# Patient Record
Sex: FEMALE | Race: WHITE | HISPANIC OR LATINO | Employment: OTHER | URBAN - METROPOLITAN AREA
[De-identification: names, ages, dates, MRNs, and addresses within clinical notes are randomized per-mention and may not be internally consistent; named-entity substitution may affect disease eponyms.]

---

## 2018-08-03 ENCOUNTER — TRANSCRIBE ORDERS (OUTPATIENT)
Dept: RADIOLOGY | Facility: HOSPITAL | Age: 58
End: 2018-08-03

## 2018-08-03 ENCOUNTER — HOSPITAL ENCOUNTER (OUTPATIENT)
Dept: RADIOLOGY | Facility: HOSPITAL | Age: 58
Discharge: HOME | End: 2018-08-03
Payer: COMMERCIAL

## 2018-08-03 DIAGNOSIS — M06.9 RHEUMATOID ARTHRITIS, INVOLVING UNSPECIFIED SITE, UNSPECIFIED RHEUMATOID FACTOR PRESENCE: Primary | ICD-10-CM

## 2018-08-03 DIAGNOSIS — M06.9 RHEUMATOID ARTHRITIS, INVOLVING UNSPECIFIED SITE, UNSPECIFIED RHEUMATOID FACTOR PRESENCE: ICD-10-CM

## 2018-08-03 PROCEDURE — 73562 X-RAY EXAM OF KNEE 3: CPT

## 2018-08-06 ENCOUNTER — TRANSCRIBE ORDERS (OUTPATIENT)
Dept: ADMINISTRATIVE | Facility: HOSPITAL | Age: 58
End: 2018-08-06

## 2018-08-06 DIAGNOSIS — N06.9 ISOLATED PROTEINURIA WITH MORPHOLOGIC LESION: Primary | ICD-10-CM

## 2018-08-14 ENCOUNTER — HOSPITAL ENCOUNTER (OUTPATIENT)
Dept: RADIOLOGY | Facility: HOSPITAL | Age: 58
Discharge: HOME | End: 2018-08-14
Payer: COMMERCIAL

## 2018-08-14 PROCEDURE — 73562 X-RAY EXAM OF KNEE 3: CPT

## 2018-08-29 ENCOUNTER — TRANSCRIBE ORDERS (OUTPATIENT)
Dept: ADMINISTRATIVE | Facility: HOSPITAL | Age: 58
End: 2018-08-29

## 2018-08-29 DIAGNOSIS — R79.89 ELEVATED LFTS: Primary | ICD-10-CM

## 2018-09-25 ENCOUNTER — TRANSCRIBE ORDERS (OUTPATIENT)
Dept: RADIOLOGY | Facility: HOSPITAL | Age: 58
End: 2018-09-25

## 2018-09-25 ENCOUNTER — HOSPITAL ENCOUNTER (OUTPATIENT)
Dept: RADIOLOGY | Facility: HOSPITAL | Age: 58
Discharge: HOME | End: 2018-09-25
Payer: COMMERCIAL

## 2018-09-25 ENCOUNTER — HOSPITAL ENCOUNTER (OUTPATIENT)
Dept: NON INVASIVE DIAGNOSTICS | Facility: HOSPITAL | Age: 58
Discharge: HOME | End: 2018-09-25
Payer: COMMERCIAL

## 2018-09-25 DIAGNOSIS — R79.89 ELEVATED LFTS: ICD-10-CM

## 2018-09-25 DIAGNOSIS — M25.50 POLYARTHRALGIA: ICD-10-CM

## 2018-09-25 DIAGNOSIS — N06.9 ISOLATED PROTEINURIA WITH MORPHOLOGIC LESION: ICD-10-CM

## 2018-09-25 DIAGNOSIS — M25.50 PAIN IN JOINT, MULTIPLE SITES: ICD-10-CM

## 2018-09-25 DIAGNOSIS — M25.50 PAIN IN JOINT, MULTIPLE SITES: Primary | ICD-10-CM

## 2018-09-25 PROCEDURE — 93306 TTE W/DOPPLER COMPLETE: CPT

## 2018-09-25 PROCEDURE — 76705 ECHO EXAM OF ABDOMEN: CPT

## 2018-09-25 PROCEDURE — 73521 X-RAY EXAM HIPS BI 2 VIEWS: CPT

## 2018-09-25 PROCEDURE — 73130 X-RAY EXAM OF HAND: CPT

## 2018-09-25 PROCEDURE — 93306 TTE W/DOPPLER COMPLETE: CPT | Performed by: INTERNAL MEDICINE

## 2018-11-05 ENCOUNTER — TRANSCRIBE ORDERS (OUTPATIENT)
Dept: ADMINISTRATIVE | Facility: HOSPITAL | Age: 58
End: 2018-11-05

## 2018-11-05 DIAGNOSIS — Z12.39 SCREENING BREAST EXAMINATION: Primary | ICD-10-CM

## 2018-11-14 ENCOUNTER — HOSPITAL ENCOUNTER (OUTPATIENT)
Dept: RADIOLOGY | Facility: HOSPITAL | Age: 58
Discharge: HOME | End: 2018-11-14
Payer: COMMERCIAL

## 2018-11-14 VITALS — WEIGHT: 173 LBS | BODY MASS INDEX: 30.65 KG/M2 | HEIGHT: 63 IN

## 2018-11-14 DIAGNOSIS — Z12.39 SCREENING BREAST EXAMINATION: ICD-10-CM

## 2018-11-14 PROCEDURE — 77067 SCR MAMMO BI INCL CAD: CPT

## 2018-11-27 ENCOUNTER — HOSPITAL ENCOUNTER (OUTPATIENT)
Dept: MAMMOGRAPHY | Facility: CLINIC | Age: 58
Discharge: HOME | End: 2018-11-27
Payer: COMMERCIAL

## 2018-11-27 ENCOUNTER — TRANSCRIBE ORDERS (OUTPATIENT)
Dept: MAMMOGRAPHY | Facility: CLINIC | Age: 58
End: 2018-11-27

## 2018-11-27 ENCOUNTER — HOSPITAL ENCOUNTER (OUTPATIENT)
Dept: ULTRASOUND IMAGING | Facility: CLINIC | Age: 58
Discharge: HOME | End: 2018-11-27
Payer: COMMERCIAL

## 2018-11-27 DIAGNOSIS — R92.8 ABNORMAL MAMMOGRAM: ICD-10-CM

## 2018-11-27 DIAGNOSIS — R92.8 ABNORMAL MAMMOGRAM: Primary | ICD-10-CM

## 2018-11-27 PROCEDURE — 77065 DX MAMMO INCL CAD UNI: CPT

## 2018-11-27 PROCEDURE — G0279 TOMOSYNTHESIS, MAMMO: HCPCS

## 2018-11-27 PROCEDURE — 76642 ULTRASOUND BREAST LIMITED: CPT

## 2019-04-10 ENCOUNTER — HOSPITAL ENCOUNTER (EMERGENCY)
Facility: HOSPITAL | Age: 59
Discharge: HOME | End: 2019-04-10
Attending: EMERGENCY MEDICINE
Payer: COMMERCIAL

## 2019-04-10 VITALS
HEART RATE: 85 BPM | BODY MASS INDEX: 31.48 KG/M2 | TEMPERATURE: 97.8 F | WEIGHT: 177.69 LBS | SYSTOLIC BLOOD PRESSURE: 131 MMHG | OXYGEN SATURATION: 98 % | HEIGHT: 63 IN | DIASTOLIC BLOOD PRESSURE: 68 MMHG | RESPIRATION RATE: 18 BRPM

## 2019-04-10 DIAGNOSIS — J40 BRONCHITIS: Primary | ICD-10-CM

## 2019-04-10 PROCEDURE — 99283 EMERGENCY DEPT VISIT LOW MDM: CPT

## 2019-04-10 PROCEDURE — 99284 EMERGENCY DEPT VISIT MOD MDM: CPT | Performed by: EMERGENCY MEDICINE

## 2019-04-10 PROCEDURE — 94640 AIRWAY INHALATION TREATMENT: CPT

## 2019-04-10 RX ORDER — ALENDRONATE SODIUM 70 MG/1
70 TABLET ORAL
COMMUNITY

## 2019-04-10 RX ORDER — ALBUTEROL SULFATE 2.5 MG/3ML
2.5 SOLUTION RESPIRATORY (INHALATION) ONCE
Status: COMPLETED | OUTPATIENT
Start: 2019-04-10 | End: 2019-04-10

## 2019-04-10 RX ORDER — NAPROXEN 500 MG/1
500 TABLET ORAL 2 TIMES DAILY WITH MEALS
COMMUNITY

## 2019-04-10 RX ORDER — AZITHROMYCIN 250 MG/1
TABLET, FILM COATED ORAL
Qty: 6 TABLET | Refills: 0 | Status: SHIPPED | OUTPATIENT
Start: 2019-04-10 | End: 2019-04-14

## 2019-04-10 RX ORDER — LEVOTHYROXINE SODIUM 0.03 MG/1
25 TABLET ORAL DAILY
COMMUNITY
End: 2020-11-23 | Stop reason: SDUPTHER

## 2019-04-10 RX ORDER — IBUPROFEN 200 MG
200 TABLET ORAL EVERY 6 HOURS PRN
COMMUNITY
End: 2021-02-25

## 2019-04-10 RX ORDER — DICLOFENAC SODIUM 75 MG/1
75 TABLET, DELAYED RELEASE ORAL 2 TIMES DAILY
COMMUNITY
End: 2020-11-23 | Stop reason: SDUPTHER

## 2019-04-10 RX ORDER — GABAPENTIN 100 MG/1
100 CAPSULE ORAL 3 TIMES DAILY
COMMUNITY

## 2019-04-10 RX ORDER — LANSOPRAZOLE 30 MG/1
30 CAPSULE, DELAYED RELEASE ORAL DAILY
COMMUNITY

## 2019-04-10 RX ORDER — CODEINE PHOSPHATE AND GUAIFENESIN 10; 100 MG/5ML; MG/5ML
5 SOLUTION ORAL 3 TIMES DAILY PRN
Qty: 50 ML | Refills: 0 | Status: SHIPPED | OUTPATIENT
Start: 2019-04-10 | End: 2019-04-20

## 2019-04-10 RX ORDER — ALBUTEROL SULFATE 90 UG/1
2 AEROSOL, METERED RESPIRATORY (INHALATION) EVERY 4 HOURS PRN
Qty: 1 INHALER | Refills: 0 | Status: SHIPPED | OUTPATIENT
Start: 2019-04-10 | End: 2021-02-25

## 2019-04-10 RX ORDER — DULOXETIN HYDROCHLORIDE 30 MG/1
30 CAPSULE, DELAYED RELEASE ORAL 2 TIMES DAILY
COMMUNITY
End: 2021-02-25

## 2019-04-10 RX ORDER — SIMVASTATIN 40 MG
40 TABLET ORAL
COMMUNITY
End: 2020-11-23 | Stop reason: SDUPTHER

## 2019-04-10 RX ADMIN — ALBUTEROL SULFATE 2.5 MG: 2.5 SOLUTION RESPIRATORY (INHALATION) at 11:23

## 2019-05-03 ENCOUNTER — APPOINTMENT (OUTPATIENT)
Dept: LAB | Facility: HOSPITAL | Age: 59
End: 2019-05-03
Payer: COMMERCIAL

## 2019-05-03 DIAGNOSIS — Z01.812 PRE-OPERATIVE LABORATORY EXAMINATION: ICD-10-CM

## 2019-05-03 DIAGNOSIS — K64.9 HEMORRHOIDS WITHOUT COMPLICATION: Primary | ICD-10-CM

## 2019-05-03 LAB
BASOPHILS # BLD AUTO: 0.03 THOUSANDS/ΜL (ref 0–0.1)
BASOPHILS NFR BLD AUTO: 1 % (ref 0–1)
EOSINOPHIL # BLD AUTO: 0.49 THOUSAND/ΜL (ref 0–0.61)
EOSINOPHIL NFR BLD AUTO: 9 % (ref 0–6)
ERYTHROCYTE [DISTWIDTH] IN BLOOD BY AUTOMATED COUNT: 12.6 % (ref 11.6–15.1)
HCT VFR BLD AUTO: 40.2 % (ref 34.8–46.1)
HGB BLD-MCNC: 12.9 G/DL (ref 11.5–15.4)
IMM GRANULOCYTES # BLD AUTO: 0.01 THOUSAND/UL (ref 0–0.2)
IMM GRANULOCYTES NFR BLD AUTO: 0 % (ref 0–2)
LYMPHOCYTES # BLD AUTO: 1.49 THOUSANDS/ΜL (ref 0.6–4.47)
LYMPHOCYTES NFR BLD AUTO: 27 % (ref 14–44)
MCH RBC QN AUTO: 30.9 PG (ref 26.8–34.3)
MCHC RBC AUTO-ENTMCNC: 32.1 G/DL (ref 31.4–37.4)
MCV RBC AUTO: 96 FL (ref 82–98)
MONOCYTES # BLD AUTO: 0.5 THOUSAND/ΜL (ref 0.17–1.22)
MONOCYTES NFR BLD AUTO: 9 % (ref 4–12)
NEUTROPHILS # BLD AUTO: 3.06 THOUSANDS/ΜL (ref 1.85–7.62)
NEUTS SEG NFR BLD AUTO: 54 % (ref 43–75)
NRBC BLD AUTO-RTO: 0 /100 WBCS
PLATELET # BLD AUTO: 278 THOUSANDS/UL (ref 149–390)
PMV BLD AUTO: 10.9 FL (ref 8.9–12.7)
RBC # BLD AUTO: 4.18 MILLION/UL (ref 3.81–5.12)
WBC # BLD AUTO: 5.58 THOUSAND/UL (ref 4.31–10.16)

## 2019-05-03 PROCEDURE — 85025 COMPLETE CBC W/AUTO DIFF WBC: CPT

## 2019-05-03 PROCEDURE — 36415 COLL VENOUS BLD VENIPUNCTURE: CPT

## 2019-05-29 ENCOUNTER — TRANSCRIBE ORDERS (OUTPATIENT)
Dept: ADMINISTRATIVE | Facility: HOSPITAL | Age: 59
End: 2019-05-29

## 2019-05-29 DIAGNOSIS — R92.8 ABNORMAL MAMMOGRAM: Primary | ICD-10-CM

## 2019-05-30 ENCOUNTER — HOSPITAL ENCOUNTER (OUTPATIENT)
Dept: MAMMOGRAPHY | Facility: CLINIC | Age: 59
Discharge: HOME | End: 2019-05-30
Payer: COMMERCIAL

## 2019-05-30 ENCOUNTER — HOSPITAL ENCOUNTER (OUTPATIENT)
Dept: ULTRASOUND IMAGING | Facility: CLINIC | Age: 59
Discharge: HOME | End: 2019-05-30
Payer: COMMERCIAL

## 2019-05-30 ENCOUNTER — TRANSCRIBE ORDERS (OUTPATIENT)
Dept: MAMMOGRAPHY | Facility: CLINIC | Age: 59
End: 2019-05-30

## 2019-05-30 VITALS — BODY MASS INDEX: 29.99 KG/M2 | WEIGHT: 180 LBS | HEIGHT: 65 IN

## 2019-05-30 DIAGNOSIS — R92.8 ABNORMAL MAMMOGRAM: ICD-10-CM

## 2019-05-30 DIAGNOSIS — R92.8 ABNORMAL MAMMOGRAM: Primary | ICD-10-CM

## 2019-05-30 PROCEDURE — 76642 ULTRASOUND BREAST LIMITED: CPT

## 2019-05-30 PROCEDURE — G0279 TOMOSYNTHESIS, MAMMO: HCPCS

## 2019-05-30 PROCEDURE — 77065 DX MAMMO INCL CAD UNI: CPT

## 2019-07-16 ENCOUNTER — TRANSCRIBE ORDERS (OUTPATIENT)
Dept: LAB | Facility: HOSPITAL | Age: 59
End: 2019-07-16

## 2019-07-16 ENCOUNTER — APPOINTMENT (OUTPATIENT)
Dept: LAB | Facility: HOSPITAL | Age: 59
End: 2019-07-16
Attending: INTERNAL MEDICINE
Payer: COMMERCIAL

## 2019-07-16 DIAGNOSIS — E66.01 CLASS 2 SEVERE OBESITY DUE TO EXCESS CALORIES WITH SERIOUS COMORBIDITY IN ADULT, UNSPECIFIED BMI (HCC): ICD-10-CM

## 2019-07-16 DIAGNOSIS — E03.9 MYXEDEMA HEART DISEASE: ICD-10-CM

## 2019-07-16 DIAGNOSIS — I51.9 MYXEDEMA HEART DISEASE: ICD-10-CM

## 2019-07-16 DIAGNOSIS — M81.0 SENILE OSTEOPOROSIS: ICD-10-CM

## 2019-07-16 DIAGNOSIS — I51.9 MYXEDEMA HEART DISEASE: Primary | ICD-10-CM

## 2019-07-16 DIAGNOSIS — E03.9 MYXEDEMA HEART DISEASE: Primary | ICD-10-CM

## 2019-07-16 LAB
25(OH)D3 SERPL-MCNC: 25.2 NG/ML (ref 30–100)
ALBUMIN SERPL BCP-MCNC: 3.7 G/DL (ref 3.5–5)
ALP SERPL-CCNC: 70 U/L (ref 46–116)
ALT SERPL W P-5'-P-CCNC: 63 U/L (ref 12–78)
ANION GAP SERPL CALCULATED.3IONS-SCNC: 3 MMOL/L (ref 4–13)
AST SERPL W P-5'-P-CCNC: 37 U/L (ref 5–45)
BILIRUB SERPL-MCNC: 0.44 MG/DL (ref 0.2–1)
BUN SERPL-MCNC: 12 MG/DL (ref 5–25)
CALCIUM SERPL-MCNC: 8.4 MG/DL (ref 8.3–10.1)
CHLORIDE SERPL-SCNC: 106 MMOL/L (ref 100–108)
CHOLEST SERPL-MCNC: 202 MG/DL (ref 50–200)
CO2 SERPL-SCNC: 27 MMOL/L (ref 21–32)
CREAT SERPL-MCNC: 0.87 MG/DL (ref 0.6–1.3)
EST. AVERAGE GLUCOSE BLD GHB EST-MCNC: 126 MG/DL
GFR SERPL CREATININE-BSD FRML MDRD: 72 ML/MIN/1.73SQ M
GLUCOSE P FAST SERPL-MCNC: 116 MG/DL (ref 65–99)
HBA1C MFR BLD: 6 % (ref 4.2–6.3)
HDLC SERPL-MCNC: 38 MG/DL (ref 40–60)
LDLC SERPL CALC-MCNC: 118 MG/DL (ref 0–100)
NONHDLC SERPL-MCNC: 164 MG/DL
POTASSIUM SERPL-SCNC: 3.8 MMOL/L (ref 3.5–5.3)
PROT SERPL-MCNC: 8 G/DL (ref 6.4–8.2)
SODIUM SERPL-SCNC: 136 MMOL/L (ref 136–145)
TRIGL SERPL-MCNC: 228 MG/DL
TSH SERPL DL<=0.05 MIU/L-ACNC: 1.42 UIU/ML (ref 0.36–3.74)

## 2019-07-16 PROCEDURE — 36415 COLL VENOUS BLD VENIPUNCTURE: CPT

## 2019-07-16 PROCEDURE — 83036 HEMOGLOBIN GLYCOSYLATED A1C: CPT

## 2019-07-16 PROCEDURE — 84443 ASSAY THYROID STIM HORMONE: CPT

## 2019-07-16 PROCEDURE — 80053 COMPREHEN METABOLIC PANEL: CPT

## 2019-07-16 PROCEDURE — 82306 VITAMIN D 25 HYDROXY: CPT

## 2019-07-16 PROCEDURE — 80061 LIPID PANEL: CPT

## 2019-07-24 ENCOUNTER — TRANSCRIBE ORDERS (OUTPATIENT)
Dept: ADMINISTRATIVE | Facility: HOSPITAL | Age: 59
End: 2019-07-24

## 2019-07-24 DIAGNOSIS — M54.50 LOW BACK PAIN WITHOUT SCIATICA, UNSPECIFIED BACK PAIN LATERALITY, UNSPECIFIED CHRONICITY: Primary | ICD-10-CM

## 2019-08-04 ENCOUNTER — HOSPITAL ENCOUNTER (OUTPATIENT)
Dept: RADIOLOGY | Facility: HOSPITAL | Age: 59
Discharge: HOME | End: 2019-08-04
Payer: COMMERCIAL

## 2019-08-04 DIAGNOSIS — M54.50 LOW BACK PAIN WITHOUT SCIATICA, UNSPECIFIED BACK PAIN LATERALITY, UNSPECIFIED CHRONICITY: ICD-10-CM

## 2019-08-04 PROCEDURE — 72148 MRI LUMBAR SPINE W/O DYE: CPT

## 2019-09-14 ENCOUNTER — HOSPITAL ENCOUNTER (EMERGENCY)
Facility: HOSPITAL | Age: 59
Discharge: HOME | End: 2019-09-14
Attending: EMERGENCY MEDICINE | Admitting: EMERGENCY MEDICINE
Payer: COMMERCIAL

## 2019-09-14 ENCOUNTER — APPOINTMENT (EMERGENCY)
Dept: RADIOLOGY | Facility: HOSPITAL | Age: 59
End: 2019-09-14
Payer: COMMERCIAL

## 2019-09-14 VITALS
BODY MASS INDEX: 31.62 KG/M2 | HEART RATE: 71 BPM | OXYGEN SATURATION: 97 % | TEMPERATURE: 97.9 F | RESPIRATION RATE: 16 BRPM | WEIGHT: 190 LBS | SYSTOLIC BLOOD PRESSURE: 155 MMHG | DIASTOLIC BLOOD PRESSURE: 69 MMHG

## 2019-09-14 DIAGNOSIS — S83.92XA LEFT KNEE SPRAIN: Primary | ICD-10-CM

## 2019-09-14 PROCEDURE — 73564 X-RAY EXAM KNEE 4 OR MORE: CPT

## 2019-09-14 PROCEDURE — 96372 THER/PROPH/DIAG INJ SC/IM: CPT

## 2019-09-14 PROCEDURE — 99284 EMERGENCY DEPT VISIT MOD MDM: CPT | Performed by: PHYSICIAN ASSISTANT

## 2019-09-14 PROCEDURE — 99283 EMERGENCY DEPT VISIT LOW MDM: CPT

## 2019-09-14 RX ORDER — LIDOCAINE 50 MG/G
1 PATCH TOPICAL ONCE
Status: DISCONTINUED | OUTPATIENT
Start: 2019-09-14 | End: 2019-09-14 | Stop reason: HOSPADM

## 2019-09-14 RX ORDER — METHOCARBAMOL 500 MG/1
500 TABLET, FILM COATED ORAL 3 TIMES DAILY
Qty: 15 TABLET | Refills: 0 | Status: SHIPPED | OUTPATIENT
Start: 2019-09-14 | End: 2020-11-23 | Stop reason: ALTCHOICE

## 2019-09-14 RX ORDER — KETOROLAC TROMETHAMINE 30 MG/ML
15 INJECTION, SOLUTION INTRAMUSCULAR; INTRAVENOUS ONCE
Status: COMPLETED | OUTPATIENT
Start: 2019-09-14 | End: 2019-09-14

## 2019-09-14 RX ORDER — METHOCARBAMOL 500 MG/1
500 TABLET, FILM COATED ORAL ONCE
Status: COMPLETED | OUTPATIENT
Start: 2019-09-14 | End: 2019-09-14

## 2019-09-14 RX ADMIN — KETOROLAC TROMETHAMINE 15 MG: 30 INJECTION, SOLUTION INTRAMUSCULAR at 13:29

## 2019-09-14 RX ADMIN — LIDOCAINE 1 PATCH: 50 PATCH CUTANEOUS at 13:29

## 2019-09-14 RX ADMIN — METHOCARBAMOL TABLETS 500 MG: 500 TABLET, COATED ORAL at 13:30

## 2019-09-14 NOTE — DISCHARGE INSTRUCTIONS
-Continue taking diclofenac sodium 75 mg as prescribed as needed for pain  - Apply 4% lidocaine cream to area  - Apply ACE wrap as needed  - Bear weight as tolerated

## 2019-09-15 NOTE — ED PROVIDER NOTES
History  Chief Complaint   Patient presents with    Knee Pain     pt c/o left knee pain that started without injury on thursday at the doctors office  reports increased pain when she stepped on to the bus     77-year-old female with no significant past medical history presents for evaluation of left knee pain for the past 3 days  Patient reports that she has been having pain over the posterior aspect of her knee which worsened today when she was walking and stepped on the bus  Patient reports that she felt a popping sensation and has pain over the posterior aspect of her knee  She denies radiation pain down her leg her upper leg  States that she has taken Voltaren for this with some relief  Last dose was this morning  Patient states that she denies any trauma or injury  Reports she is not able to bear much weight on the knee due to pain  She denies paresthesias, open wounds or swelling  Denies fever, chills  No history of DVTs in the past           Prior to Admission Medications   Prescriptions Last Dose Informant Patient Reported? Taking?    DULoxetine (CYMBALTA) 30 mg delayed release capsule   Yes No   Sig: Take 30 mg by mouth 2 (two) times a day   albuterol (PROVENTIL HFA,VENTOLIN HFA) 90 mcg/act inhaler   No No   Sig: Inhale 2 puffs every 4 (four) hours as needed for wheezing   alendronate (FOSAMAX) 70 mg tablet   Yes No   Sig: Take 70 mg by mouth every 7 days   diclofenac (VOLTAREN) 75 mg EC tablet   Yes No   Sig: Take 75 mg by mouth 2 (two) times a day   gabapentin (NEURONTIN) 100 mg capsule   Yes No   Sig: Take 100 mg by mouth 3 (three) times a day   ibuprofen (MOTRIN) 200 mg tablet   Yes No   Sig: Take 200 mg by mouth every 6 (six) hours as needed for mild pain   lansoprazole (PREVACID) 30 mg capsule   Yes No   Sig: Take 30 mg by mouth daily   levothyroxine 25 mcg tablet   Yes No   Sig: Take 25 mcg by mouth daily   naproxen (NAPROSYN) 500 mg tablet   Yes No   Sig: Take 500 mg by mouth 2 (two) times a day with meals   simvastatin (ZOCOR) 40 mg tablet   Yes No   Sig: Take 40 mg by mouth daily at bedtime      Facility-Administered Medications: None       History reviewed  No pertinent past medical history  Past Surgical History:   Procedure Laterality Date    BREAST EXCISIONAL BIOPSY Left 2015    in [place] [facility]    BREAST EXCISIONAL BIOPSY Left 2016    in [address]      @ age 38       Family History   Problem Relation Age of Onset    No Known Problems Sister     No Known Problems Daughter     Breast cancer Half-Sister     No Known Problems Sister      I have reviewed and agree with the history as documented  Social History     Tobacco Use    Smoking status: Never Smoker    Smokeless tobacco: Never Used   Substance Use Topics    Alcohol use: Not Currently    Drug use: Never        Review of Systems   Constitutional: Negative for chills and fever  Musculoskeletal: Positive for arthralgias, gait problem and myalgias  Negative for joint swelling  Skin: Negative for color change, pallor, rash and wound  Neurological: Negative for weakness and numbness  Physical Exam  Physical Exam   Constitutional: She appears well-developed and well-nourished  No distress  Musculoskeletal: She exhibits tenderness  She exhibits no edema or deformity  Left knee: She exhibits decreased range of motion  She exhibits no swelling, no effusion, no ecchymosis, no deformity, no laceration, no erythema, normal alignment, no LCL laxity, normal patellar mobility, no bony tenderness, normal meniscus and no MCL laxity  Tenderness found  No medial joint line, no lateral joint line, no MCL, no LCL and no patellar tendon tenderness noted  ttp over the popliteal region  Able to flex greater than 90 degrees however starts to have pain  Full extension of knee  5/5 strength  Other joints of left lower extremity full AROM and 5/5 strength  DP pulses 2+ and CR < 2 seconds  Neurological: She is alert  Skin: Skin is warm  Capillary refill takes less than 2 seconds  No rash noted  She is not diaphoretic  No erythema  No pallor  Psychiatric: She has a normal mood and affect  Vital Signs  ED Triage Vitals [09/14/19 1206]   Temperature Pulse Respirations Blood Pressure SpO2   97 9 °F (36 6 °C) 71 16 155/69 97 %      Temp Source Heart Rate Source Patient Position - Orthostatic VS BP Location FiO2 (%)   Tympanic -- -- -- --      Pain Score       7           Vitals:    09/14/19 1206   BP: 155/69   Pulse: 71         Visual Acuity      ED Medications  Medications   ketorolac (TORADOL) injection 15 mg (15 mg Intramuscular Given 9/14/19 1329)   methocarbamol (ROBAXIN) tablet 500 mg (500 mg Oral Given 9/14/19 1330)       Diagnostic Studies  Results Reviewed     None                 XR knee 4+ vw left injury   ED Interpretation by Iliana Ty PA-C (09/14 1238)   No fracture  Degenerative changes  Final Result by Sylwia Crockett MD (09/15 0700)      No acute osseous abnormality  Workstation performed: [id number]                    Procedures  Procedures       ED Course                               MDM  Number of Diagnoses or Management Options  Left knee sprain:       Disposition  Final diagnoses:   Left knee sprain     Time reflects when diagnosis was documented in both MDM as applicable and the Disposition within this note     Time User Action Codes Description Comment    9/14/2019 12:56 PM Ashely James Add Lis Shackelford  92XA] Left knee sprain       ED Disposition     ED Disposition Condition Date/Time Comment    Discharge Stable Sat Sep 14, 2019 12:56 PM [facility] discharge to home/self care              Follow-up Information     Follow up With Specialties Details Why Contact Info Additional [address] [facility] Schedule an appointment as soon as possible for a visit in 3 days Follow up for further evaluation, If symptoms worsen [address] [facility] [id number], [address]          Discharge Medication List as of 9/14/2019 12:57 PM      START taking these medications    Details   methocarbamol (ROBAXIN) 500 mg tablet Take 1 tablet (500 mg total) by mouth 3 (three) times a day for 5 days, Starting Sat 9/14/2019, Until Thu 9/19/2019, Print         CONTINUE these medications which have NOT CHANGED    Details   albuterol (PROVENTIL HFA,VENTOLIN HFA) 90 mcg/act inhaler Inhale 2 puffs every 4 (four) hours as needed for wheezing, Starting Wed 4/10/2019, Print      alendronate (FOSAMAX) 70 mg tablet Take 70 mg by mouth every 7 days, Historical Med      diclofenac (VOLTAREN) 75 mg EC tablet Take 75 mg by mouth 2 (two) times a day, Historical Med      DULoxetine (CYMBALTA) 30 mg delayed release capsule Take 30 mg by mouth 2 (two) times a day, Historical Med      gabapentin (NEURONTIN) 100 mg capsule Take 100 mg by mouth 3 (three) times a day, Historical Med      ibuprofen (MOTRIN) 200 mg tablet Take 200 mg by mouth every 6 (six) hours as needed for mild pain, Historical Med      lansoprazole (PREVACID) 30 mg capsule Take 30 mg by mouth daily, Historical Med      levothyroxine 25 mcg tablet Take 25 mcg by mouth daily, Historical Med      naproxen (NAPROSYN) 500 mg tablet Take 500 mg by mouth 2 (two) times a day with meals, Historical Med      simvastatin (ZOCOR) 40 mg tablet Take 40 mg by mouth daily at bedtime, Historical Med           No discharge procedures on file      ED Provider  Electronically Signed by           Leandra Mann PA-C  09/15/19 1016

## 2019-10-28 ENCOUNTER — TRANSCRIBE ORDERS (OUTPATIENT)
Dept: ADMINISTRATIVE | Facility: HOSPITAL | Age: 59
End: 2019-10-28

## 2019-10-28 DIAGNOSIS — E78.5 HYPERLIPIDEMIA, UNSPECIFIED HYPERLIPIDEMIA TYPE: Primary | ICD-10-CM

## 2019-10-28 DIAGNOSIS — E66.9 OBESITY, UNSPECIFIED CLASSIFICATION, UNSPECIFIED OBESITY TYPE, UNSPECIFIED WHETHER SERIOUS COMORBIDITY PRESENT: ICD-10-CM

## 2019-10-30 ENCOUNTER — HOSPITAL ENCOUNTER (OUTPATIENT)
Dept: RADIOLOGY | Facility: HOSPITAL | Age: 59
Discharge: HOME | End: 2019-10-30
Attending: INTERNAL MEDICINE
Payer: COMMERCIAL

## 2019-10-30 DIAGNOSIS — E66.9 OBESITY, UNSPECIFIED CLASSIFICATION, UNSPECIFIED OBESITY TYPE, UNSPECIFIED WHETHER SERIOUS COMORBIDITY PRESENT: ICD-10-CM

## 2019-10-30 DIAGNOSIS — E78.5 HYPERLIPIDEMIA, UNSPECIFIED HYPERLIPIDEMIA TYPE: ICD-10-CM

## 2019-10-30 PROCEDURE — 76700 US EXAM ABDOM COMPLETE: CPT

## 2020-06-11 ENCOUNTER — APPOINTMENT (OUTPATIENT)
Dept: LAB | Facility: HOSPITAL | Age: 60
End: 2020-06-11
Payer: MEDICARE

## 2020-06-11 DIAGNOSIS — E66.9 OBESITY, MILD: Primary | ICD-10-CM

## 2020-06-11 LAB
ALBUMIN SERPL BCP-MCNC: 3.8 G/DL (ref 3.5–5)
ALP SERPL-CCNC: 68 U/L (ref 46–116)
ALT SERPL W P-5'-P-CCNC: 68 U/L (ref 12–78)
ANION GAP SERPL CALCULATED.3IONS-SCNC: 1 MMOL/L (ref 4–13)
AST SERPL W P-5'-P-CCNC: 40 U/L (ref 5–45)
BILIRUB SERPL-MCNC: 0.46 MG/DL (ref 0.2–1)
BUN SERPL-MCNC: 12 MG/DL (ref 5–25)
CALCIUM SERPL-MCNC: 9.2 MG/DL (ref 8.3–10.1)
CHLORIDE SERPL-SCNC: 106 MMOL/L (ref 100–108)
CHOLEST SERPL-MCNC: 134 MG/DL (ref 50–200)
CO2 SERPL-SCNC: 31 MMOL/L (ref 21–32)
CREAT SERPL-MCNC: 0.88 MG/DL (ref 0.6–1.3)
EST. AVERAGE GLUCOSE BLD GHB EST-MCNC: 123 MG/DL
GFR SERPL CREATININE-BSD FRML MDRD: 71 ML/MIN/1.73SQ M
GLUCOSE P FAST SERPL-MCNC: 108 MG/DL (ref 65–99)
HBA1C MFR BLD: 5.9 %
HDLC SERPL-MCNC: 45 MG/DL
LDLC SERPL CALC-MCNC: 51 MG/DL (ref 0–100)
NONHDLC SERPL-MCNC: 89 MG/DL
POTASSIUM SERPL-SCNC: 3.9 MMOL/L (ref 3.5–5.3)
PROT SERPL-MCNC: 8.1 G/DL (ref 6.4–8.2)
SODIUM SERPL-SCNC: 138 MMOL/L (ref 136–145)
T4 FREE SERPL-MCNC: 1.06 NG/DL (ref 0.76–1.46)
TRIGL SERPL-MCNC: 188 MG/DL
TSH SERPL DL<=0.05 MIU/L-ACNC: 4.4 UIU/ML (ref 0.36–3.74)

## 2020-06-11 PROCEDURE — 80053 COMPREHEN METABOLIC PANEL: CPT

## 2020-06-11 PROCEDURE — 84443 ASSAY THYROID STIM HORMONE: CPT

## 2020-06-11 PROCEDURE — 84439 ASSAY OF FREE THYROXINE: CPT

## 2020-06-11 PROCEDURE — 36415 COLL VENOUS BLD VENIPUNCTURE: CPT

## 2020-06-11 PROCEDURE — 80061 LIPID PANEL: CPT

## 2020-06-11 PROCEDURE — 83036 HEMOGLOBIN GLYCOSYLATED A1C: CPT

## 2020-06-22 ENCOUNTER — HOSPITAL ENCOUNTER (EMERGENCY)
Facility: HOSPITAL | Age: 60
Discharge: HOME | End: 2020-06-22
Attending: EMERGENCY MEDICINE | Admitting: EMERGENCY MEDICINE
Payer: MEDICARE

## 2020-06-22 ENCOUNTER — APPOINTMENT (EMERGENCY)
Dept: RADIOLOGY | Facility: HOSPITAL | Age: 60
End: 2020-06-22
Payer: MEDICARE

## 2020-06-22 VITALS
HEIGHT: 65 IN | OXYGEN SATURATION: 95 % | SYSTOLIC BLOOD PRESSURE: 128 MMHG | TEMPERATURE: 97.9 F | DIASTOLIC BLOOD PRESSURE: 67 MMHG | WEIGHT: 175 LBS | BODY MASS INDEX: 29.16 KG/M2 | RESPIRATION RATE: 20 BRPM | HEART RATE: 80 BPM

## 2020-06-22 DIAGNOSIS — R19.7 DIARRHEA, UNSPECIFIED TYPE: ICD-10-CM

## 2020-06-22 DIAGNOSIS — R10.30 LOWER ABDOMINAL PAIN: Primary | ICD-10-CM

## 2020-06-22 LAB
ALBUMIN SERPL BCP-MCNC: 4 G/DL (ref 3.5–5)
ALP SERPL-CCNC: 71 U/L (ref 46–116)
ALT SERPL W P-5'-P-CCNC: 68 U/L (ref 12–78)
ANION GAP SERPL CALCULATED.3IONS-SCNC: 3 MMOL/L (ref 4–13)
AST SERPL W P-5'-P-CCNC: 54 U/L (ref 5–45)
BASOPHILS # BLD AUTO: 0.02 THOUSANDS/ΜL (ref 0–0.1)
BASOPHILS NFR BLD AUTO: 0 % (ref 0–1)
BILIRUB SERPL-MCNC: 0.44 MG/DL (ref 0.2–1)
BILIRUB UR QL STRIP: NEGATIVE
BUN SERPL-MCNC: 11 MG/DL (ref 5–25)
CALCIUM SERPL-MCNC: 10 MG/DL (ref 8.3–10.1)
CHLORIDE SERPL-SCNC: 106 MMOL/L (ref 100–108)
CLARITY UR: CLEAR
CO2 SERPL-SCNC: 29 MMOL/L (ref 21–32)
COLOR UR: YELLOW
CREAT SERPL-MCNC: 1.01 MG/DL (ref 0.6–1.3)
EOSINOPHIL # BLD AUTO: 0.14 THOUSAND/ΜL (ref 0–0.61)
EOSINOPHIL NFR BLD AUTO: 2 % (ref 0–6)
ERYTHROCYTE [DISTWIDTH] IN BLOOD BY AUTOMATED COUNT: 12.7 % (ref 11.6–15.1)
GFR SERPL CREATININE-BSD FRML MDRD: 60 ML/MIN/1.73SQ M
GLUCOSE SERPL-MCNC: 115 MG/DL (ref 65–140)
GLUCOSE UR STRIP-MCNC: NEGATIVE MG/DL
HCT VFR BLD AUTO: 43.7 % (ref 34.8–46.1)
HGB BLD-MCNC: 14 G/DL (ref 11.5–15.4)
HGB UR QL STRIP.AUTO: NEGATIVE
IMM GRANULOCYTES # BLD AUTO: 0.01 THOUSAND/UL (ref 0–0.2)
IMM GRANULOCYTES NFR BLD AUTO: 0 % (ref 0–2)
KETONES UR STRIP-MCNC: NEGATIVE MG/DL
LEUKOCYTE ESTERASE UR QL STRIP: NEGATIVE
LIPASE SERPL-CCNC: 226 U/L (ref 73–393)
LYMPHOCYTES # BLD AUTO: 1.96 THOUSANDS/ΜL (ref 0.6–4.47)
LYMPHOCYTES NFR BLD AUTO: 31 % (ref 14–44)
MCH RBC QN AUTO: 30.8 PG (ref 26.8–34.3)
MCHC RBC AUTO-ENTMCNC: 32 G/DL (ref 31.4–37.4)
MCV RBC AUTO: 96 FL (ref 82–98)
MONOCYTES # BLD AUTO: 0.54 THOUSAND/ΜL (ref 0.17–1.22)
MONOCYTES NFR BLD AUTO: 8 % (ref 4–12)
NEUTROPHILS # BLD AUTO: 3.76 THOUSANDS/ΜL (ref 1.85–7.62)
NEUTS SEG NFR BLD AUTO: 59 % (ref 43–75)
NITRITE UR QL STRIP: NEGATIVE
NRBC BLD AUTO-RTO: 0 /100 WBCS
PH UR STRIP.AUTO: 6.5 [PH]
PLATELET # BLD AUTO: 298 THOUSANDS/UL (ref 149–390)
PMV BLD AUTO: 10.9 FL (ref 8.9–12.7)
POTASSIUM SERPL-SCNC: 4 MMOL/L (ref 3.5–5.3)
PROT SERPL-MCNC: 8.6 G/DL (ref 6.4–8.2)
PROT UR STRIP-MCNC: NEGATIVE MG/DL
RBC # BLD AUTO: 4.54 MILLION/UL (ref 3.81–5.12)
SODIUM SERPL-SCNC: 138 MMOL/L (ref 136–145)
SP GR UR STRIP.AUTO: 1 (ref 1–1.03)
UROBILINOGEN UR QL STRIP.AUTO: 0.2 E.U./DL
WBC # BLD AUTO: 6.43 THOUSAND/UL (ref 4.31–10.16)

## 2020-06-22 PROCEDURE — 74177 CT ABD & PELVIS W/CONTRAST: CPT

## 2020-06-22 PROCEDURE — 80053 COMPREHEN METABOLIC PANEL: CPT | Performed by: EMERGENCY MEDICINE

## 2020-06-22 PROCEDURE — 96360 HYDRATION IV INFUSION INIT: CPT

## 2020-06-22 PROCEDURE — 83690 ASSAY OF LIPASE: CPT | Performed by: EMERGENCY MEDICINE

## 2020-06-22 PROCEDURE — 99284 EMERGENCY DEPT VISIT MOD MDM: CPT | Performed by: EMERGENCY MEDICINE

## 2020-06-22 PROCEDURE — 81003 URINALYSIS AUTO W/O SCOPE: CPT | Performed by: EMERGENCY MEDICINE

## 2020-06-22 PROCEDURE — 99284 EMERGENCY DEPT VISIT MOD MDM: CPT

## 2020-06-22 PROCEDURE — 85025 COMPLETE CBC W/AUTO DIFF WBC: CPT | Performed by: EMERGENCY MEDICINE

## 2020-06-22 PROCEDURE — 36415 COLL VENOUS BLD VENIPUNCTURE: CPT | Performed by: EMERGENCY MEDICINE

## 2020-06-22 RX ORDER — DICYCLOMINE HCL 20 MG
20 TABLET ORAL EVERY 6 HOURS
Qty: 20 TABLET | Refills: 0 | Status: SHIPPED | OUTPATIENT
Start: 2020-06-22 | End: 2020-11-23 | Stop reason: SDUPTHER

## 2020-06-22 RX ADMIN — IOHEXOL 100 ML: 350 INJECTION, SOLUTION INTRAVENOUS at 11:58

## 2020-06-22 RX ADMIN — SODIUM CHLORIDE 1000 ML: 0.9 INJECTION, SOLUTION INTRAVENOUS at 11:01

## 2020-08-21 ENCOUNTER — HOSPITAL ENCOUNTER (OUTPATIENT)
Dept: SLEEP CENTER | Facility: CLINIC | Age: 60
Discharge: HOME | End: 2020-08-21
Payer: MEDICARE

## 2020-08-21 ENCOUNTER — OFFICE VISIT (OUTPATIENT)
Dept: SLEEP CENTER | Facility: CLINIC | Age: 60
End: 2020-08-21
Payer: MEDICARE

## 2020-08-21 VITALS
WEIGHT: 178 LBS | HEIGHT: 63 IN | DIASTOLIC BLOOD PRESSURE: 70 MMHG | HEART RATE: 74 BPM | SYSTOLIC BLOOD PRESSURE: 118 MMHG | BODY MASS INDEX: 31.54 KG/M2

## 2020-08-21 DIAGNOSIS — G47.00 INSOMNIA, UNSPECIFIED TYPE: ICD-10-CM

## 2020-08-21 DIAGNOSIS — R06.83 SNORING: ICD-10-CM

## 2020-08-21 DIAGNOSIS — R06.83 SNORING: Primary | ICD-10-CM

## 2020-08-21 PROBLEM — E03.9 ACQUIRED HYPOTHYROIDISM: Status: ACTIVE | Noted: 2020-08-21

## 2020-08-21 PROBLEM — M79.7 FIBROMYALGIA: Status: ACTIVE | Noted: 2020-08-21

## 2020-08-21 PROCEDURE — 95810 POLYSOM 6/> YRS 4/> PARAM: CPT | Performed by: PSYCHIATRY & NEUROLOGY

## 2020-08-21 PROCEDURE — 95810 POLYSOM 6/> YRS 4/> PARAM: CPT

## 2020-08-21 PROCEDURE — 99204 OFFICE O/P NEW MOD 45 MIN: CPT | Performed by: PSYCHIATRY & NEUROLOGY

## 2020-08-21 RX ORDER — DICLOFENAC SODIUM 75 MG/1
TABLET, DELAYED RELEASE ORAL
COMMUNITY
Start: 2020-08-03 | End: 2021-02-25

## 2020-08-21 RX ORDER — CHOLECALCIFEROL (VITAMIN D3) 125 MCG
CAPSULE ORAL
COMMUNITY
Start: 2020-08-15

## 2020-08-21 RX ORDER — TRIAMCINOLONE ACETONIDE 0.1 %
PASTE (GRAM) DENTAL
COMMUNITY
Start: 2020-07-21 | End: 2021-02-25

## 2020-08-21 RX ORDER — CHOLECALCIFEROL (VITAMIN D3) 50 MCG
TABLET ORAL
COMMUNITY
Start: 2020-08-06

## 2020-08-21 RX ORDER — GABAPENTIN 100 MG/1
100 CAPSULE ORAL 3 TIMES DAILY
COMMUNITY
End: 2020-11-23 | Stop reason: SDUPTHER

## 2020-08-21 RX ORDER — TRIAMCINOLONE ACETONIDE 1 MG/G
CREAM TOPICAL
COMMUNITY
Start: 2020-07-21 | End: 2021-02-25

## 2020-08-21 RX ORDER — OMEPRAZOLE 20 MG/1
20 CAPSULE, DELAYED RELEASE ORAL DAILY
COMMUNITY

## 2020-08-21 RX ORDER — ASCORBIC ACID 500 MG
500 TABLET ORAL DAILY
COMMUNITY

## 2020-08-21 RX ORDER — ALENDRONATE SODIUM 70 MG/1
TABLET ORAL
COMMUNITY
Start: 2020-07-01 | End: 2020-11-23 | Stop reason: SDUPTHER

## 2020-08-21 RX ORDER — LEVOTHYROXINE SODIUM 0.03 MG/1
TABLET ORAL
COMMUNITY
Start: 2020-08-17

## 2020-08-21 RX ORDER — DICYCLOMINE HCL 20 MG
TABLET ORAL
COMMUNITY
Start: 2020-06-22 | End: 2021-02-25

## 2020-08-21 RX ORDER — TRAZODONE HYDROCHLORIDE 50 MG/1
50 TABLET ORAL
Qty: 30 TABLET | Refills: 1 | Status: SHIPPED | OUTPATIENT
Start: 2020-08-21 | End: 2020-11-06

## 2020-08-21 RX ORDER — SIMVASTATIN 40 MG
TABLET ORAL
COMMUNITY
Start: 2020-07-23 | End: 2021-02-25

## 2020-08-21 NOTE — PATIENT INSTRUCTIONS
Recommendations:  1) In lab diagnostic Polysomnography   2) Driving safety was reviewed with patient  If the patient feels too sleepy to drive she knows not to drive  If she becomes sleepy while driving she will pull over and nap  3) Follow-up after initiation of treatment  4) Call with any questions or concerns     5) Trazodone 25-50mg at bedtime

## 2020-08-21 NOTE — LETTER
August 21, 2020     Akash Owen DO  [facility] [address] [facility]    Patient: Shermon Scheuermann   YOB: 1960   Date of Visit: 8/21/2020       Dear Dr Sudhakar Baca: Thank you for referring [address] to me for evaluation  Below are my notes for this consultation  If you have questions, please do not hesitate to call me  I look forward to following your patient along with you  Sincerely,        Glenna Davey MD        CC: No Recipients  Glenna Davey MD  8/21/2020  9:24 AM  Sign when Signing Visit  Sleep Medicine Consultation Note    HPI:  Ms Francisco Craft is a 56 y o  female seen at the request of Akash Owen DO for advice regarding suspected sleep disordered breathing  The patient stated that she was advised by her dentist to do a sleep test and her PCP wanted her to be evaluated due to snoring  She has been snoring for three years, or so  This is every night  The snoring is moderate in intensity, this does not wake her, but wakes her   This is better on her side than on her back  She had COVID in April and since then has lost 10 lbs  Since then, she has trouble sleeping, falling asleep  Her PCP prescribed melatonin, but this has not been helpful  Prior to Brianda, she has not had any issues with her sleep  She also has abdominal pain since COVID which will keep her awake      Please see below for continuation of the HPI:      Sleep Disordered Breathing:  -Snoring: yes   -Severity: loudy   -Frequency: nightly   -Duration: 3 years   -Over time: same   -Modifying factors: side better than back  -Observed Apneas: no  -Mouth Breathing at night: no  -Dry Mouth in morning: sometimes   -Nocturnal Gasping: no  -Nasal Obstruction: no  -Weight: 10 lbs weight loss    Sleep Pattern:  -Location: bedroom  -Bed/Recliner/Wedge: bed  -Bed Partner: yes  -HOB: flat  -# of pillows under head: 2  -Position: side and back  -Bedtime: 10pm  -Lights out: same time  -Environmental: No lights/TV  -Latency: 2 hours  -Awakenings: 3   -Reason: void   -Duration: mins  -Wake time: 7am   -Alarm: no  -Rise time: same time  -Days off: same  -Shift Work: n/a  -Patient's estimate of total sleep time: 3-4h    Daytime Symptoms:  -Upon Awakening: a little tired  -Daytime fatigue/sleepiness: denied  -Naps: no  -Involuntary Dozing: no  -Cognitive Symptoms: no  -Driving: Difficulty with sleepiness and driving:  no   -- Close calls related to sleepiness: no   -- Accidents related to sleepiness: no      Questionnaires:   Sitting and reading: Would never doze  Watching TV: Would never doze  Sitting, inactive in a public place (e g  a theatre or a meeting): Would never doze  As a passenger in a car for an hour without a break: Would never doze  Lying down to rest in the afternoon when circumstances permit: High chance of dozing  Sitting and talking to someone: Would never doze  Sitting quietly after a lunch without alcohol: Would never doze  In a car, while stopped for a few minutes in traffic: Would never doze  Total score: 3      Sleep Review of Symptoms:  -Parasomnias:  --Sleep Walking: no  --Dream Enactment: no  --Bruxism: no  -Motor:  --RLS: not often  --PLMS: no  -Narcolepsy:  --Hallucinations: no  --Paralysis: no  --Cataplexy: no    Childhood Sleep History: no    Prior Sleep Studies/Evaluations:  none    Family History:  Family history of sleep disorders: snoring in her sister    Patient Active Problem List   Diagnosis    Acquired hypothyroidism    Insomnia    Snoring    Fibromyalgia     No past medical history on file  --> Denies any cardiopulmonary disease  --> Seizure hx: denies  --> Head injury with LOC: denies  --> Supplemental Oxygen Use: denies    Labs     No results found for: WBC, RBC, HGB, HCT, MCV, MCH, MCHC, RDWCV    No past surgical history on file      --> ENT procedures: denies    Current Outpatient Medications   Medication Sig Dispense Refill    alendronate (FOSAMAX) 70 mg tablet 1 TABLET ONE TIME PER WEEK-TAKE ON AN EMPTY STOMACH WITH A GLASS OF WATER-STAY SITTING UPRIGHT FOR AN HOUR AFTER TAKING      ascorbic acid (VITAMIN C) 500 mg tablet Take 500 mg by mouth daily      Cholecalciferol (Vitamin D) 50 MCG (2000 UT) tablet ONE DAILY FOR VITAMIN D SUPPLEMENT      diclofenac (VOLTAREN) 75 mg EC tablet TAKE 1 TABLET (75 MG) BY ORAL ROUTE 2 TIMES PER DAY PRN ARTHRITIS PAIN      dicyclomine (BENTYL) 20 mg tablet TAKE ONE TABLET BY MOUTH EVERY 6 HOURS FOR 5 DAYS      gabapentin (NEURONTIN) 100 mg capsule Take 100 mg by mouth 3 (three) times a day      levothyroxine 25 mcg tablet TAKE 1 TABLET (25 MCG) BY ORAL ROUTE ONCE DAILY AN HOUR BEFORE BREAKFAST-TAKE WITH WATER      Melatonin 5 MG TABS TAKE 1 TABLET(S) EVERY DAY BY ORAL ROUTE AT DINNER FOR 90 DAYS   omeprazole (PriLOSEC) 20 mg delayed release capsule Take 20 mg by mouth daily      simvastatin (ZOCOR) 40 mg tablet TAKE 1 TABLET (40 MG) BY ORAL ROUTE ONCE DAILY IN THE EVENING FOR 30 DAYS      triamcinolone (KENALOG) 0 1 % oral topical paste APPLY IN MOUTH TOPICALLY TO ULCER AFTER EATING AND DRINKING THREE TIMES DAILY AS NEEDED      triamcinolone (KENALOG) 0 1 % cream APPLY A THIN LAYER TO THE AFFECTED AREA(S) TWICE DAILY AS NEEDED       No current facility-administered medications for this visit            Social History:  -Employment: homemaker  -Smoking: no  -Caffeine: 8 oz of coffee  -Alcohol: no  -THC: no  -OTC/Supplements/herbals:   -Illicits:  denies  -Family: lives with     ROS:  Genitourinary need to urinate more than twice a night and post menopausal (no peroids)   Cardiology none   Gastrointestinal abdominal pain or cramping that disturb sleep    Neurology muscle weakness   Constitutional claustrophobia   Integumentary none   Psychiatry none   Musculoskeletal joint pain, muscle aches, back pain, legs twitching/jerking, sciatica and leg cramps   Pulmonary snoring   ENT none   Endocrine frequent urination Hematological blood donor       MSE:  -Alert and appropriate: alert, calm, cooperative  -Oriented to person, place and time:  name, age, location, day/date/mon/yr  -Behavior: good, sustained eye contact  -Speech: Unremarkable rate/rhythm/volume  -Mood: "good"  -Affect: full range  -Thought Processes: linear, logical, goal directed  PE:  Body mass index is 31 53 kg/m²  Vitals:    08/21/20 0800   BP: 118/70   Pulse: 74   Weight: 80 7 kg (178 lb)   Height: 5' 3" (1 6 m)       -General:  In NAD    -Eyes: Conjunctival injection: none     -EOM:  PERRLA, EOMI   -Eyelid hooding: no    -ENT: MP: 4/4   -Facial deformity: no retrognathia   -Hard palate: moderate arch   -Soft palate: crowding with small airway, narrow opening, elongated uvula   -Gums and teeth: normal dentition   -Tongue: wide with  Scalloping   -Nares:  Patent    -Neck/Lymphatics: Lymphadenopathy:  none appreciated   -Masses:  none appreciated   -Circumference: Neck Circumference: 15 5"    -Cardiac: Auscultation:  RRR   - LE edema over shins: none appreciated    -Pulm: -Respirations: unlaboured         -Auscultation:  CTA bilaterally, posterior fields    -Neuro: No resting tremor     -Musculoskeletal: Gait and stance: normal turning and ambulation; unremarkable  Assessment:  Ms Gino Huddleston is a 56 y o  female who is seen to evaluate for possible obstructive sleep apnea  Given the patient's symptoms of snoring in the context of a narrow airway, large tongue, and a crowded OP, a diagnosis of obstructive sleep apnea is likely  The pathophysiology of, the reasons to treat and treatment options for obstructive sleep apnea were all reviewed with the patient today  Discussed the testing options and reviewed the benefits and downsides of both, patient opted for the in lab testing  She is amenable to treatment with PAP therapy   Discussed keeping nasal passages clear, abstaining from alcohol, and other sedating drugs at night- which will worsen symptoms of MARI  She also has had insomnia since being sick with COVID in April, will try trazodone 25-50mg at bedtime  --History provided by: patient   --Records reviewed: in chart      Recommendations:  1) In lab diagnostic Polysomnography   2) Driving safety was reviewed with patient  If the patient feels too sleepy to drive she knows not to drive  If she becomes sleepy while driving she will pull over and nap  3) Follow-up after initiation of treatment  4) Call with any questions or concerns  5) Trazodone 25-50mg at bedtime    All questions answered for the patient, who indicated understanding and agreed with the plan       Wendy Bojorquez MD  Psychiatry/ Sleep medicine

## 2020-08-21 NOTE — PROGRESS NOTES
Sleep Medicine Consultation Note    HPI:  Ms Elsa Lincoln is a 56 y o  female seen at the request of Jazlyn Armstrong DO for advice regarding suspected sleep disordered breathing  The patient stated that she was advised by her dentist to do a sleep test and her PCP wanted her to be evaluated due to snoring  She has been snoring for three years, or so  This is every night  The snoring is moderate in intensity, this does not wake her, but wakes her   This is better on her side than on her back  She had COVID in April and since then has lost 10 lbs  Since then, she has trouble sleeping, falling asleep  Her PCP prescribed melatonin, but this has not been helpful  Prior to Brianda, she has not had any issues with her sleep  She also has abdominal pain since COVID which will keep her awake      Please see below for continuation of the HPI:      Sleep Disordered Breathing:  -Snoring: yes   -Severity: loudy   -Frequency: nightly   -Duration: 3 years   -Over time: same   -Modifying factors: side better than back  -Observed Apneas: no  -Mouth Breathing at night: no  -Dry Mouth in morning: sometimes   -Nocturnal Gasping: no  -Nasal Obstruction: no  -Weight: 10 lbs weight loss    Sleep Pattern:  -Location: bedroom  -Bed/Recliner/Wedge: bed  -Bed Partner: yes  -HOB: flat  -# of pillows under head: 2  -Position: side and back  -Bedtime: 10pm  -Lights out: same time  -Environmental: No lights/TV  -Latency: 2 hours  -Awakenings: 3   -Reason: void   -Duration: mins  -Wake time: 7am   -Alarm: no  -Rise time: same time  -Days off: same  -Shift Work: n/a  -Patient's estimate of total sleep time: 3-4h    Daytime Symptoms:  -Upon Awakening: a little tired  -Daytime fatigue/sleepiness: denied  -Naps: no  -Involuntary Dozing: no  -Cognitive Symptoms: no  -Driving: Difficulty with sleepiness and driving:  no   -- Close calls related to sleepiness: no   -- Accidents related to sleepiness: no      Questionnaires:   Sitting and reading: Would never doze  Watching TV: Would never doze  Sitting, inactive in a public place (e g  a theatre or a meeting): Would never doze  As a passenger in a car for an hour without a break: Would never doze  Lying down to rest in the afternoon when circumstances permit: High chance of dozing  Sitting and talking to someone: Would never doze  Sitting quietly after a lunch without alcohol: Would never doze  In a car, while stopped for a few minutes in traffic: Would never doze  Total score: 3      Sleep Review of Symptoms:  -Parasomnias:  --Sleep Walking: no  --Dream Enactment: no  --Bruxism: no  -Motor:  --RLS: not often  --PLMS: no  -Narcolepsy:  --Hallucinations: no  --Paralysis: no  --Cataplexy: no    Childhood Sleep History: no    Prior Sleep Studies/Evaluations:  none    Family History:  Family history of sleep disorders: snoring in her sister    Patient Active Problem List   Diagnosis    Acquired hypothyroidism    Insomnia    Snoring    Fibromyalgia     No past medical history on file  --> Denies any cardiopulmonary disease  --> Seizure hx: denies  --> Head injury with LOC: denies  --> Supplemental Oxygen Use: denies    Labs     No results found for: WBC, RBC, HGB, HCT, MCV, MCH, MCHC, RDWCV    No past surgical history on file      --> ENT procedures: denies    Current Outpatient Medications   Medication Sig Dispense Refill    alendronate (FOSAMAX) 70 mg tablet 1 TABLET ONE TIME PER WEEK-TAKE ON AN EMPTY STOMACH WITH A GLASS OF WATER-STAY SITTING UPRIGHT FOR AN HOUR AFTER TAKING      ascorbic acid (VITAMIN C) 500 mg tablet Take 500 mg by mouth daily      Cholecalciferol (Vitamin D) 50 MCG (2000 UT) tablet ONE DAILY FOR VITAMIN D SUPPLEMENT      diclofenac (VOLTAREN) 75 mg EC tablet TAKE 1 TABLET (75 MG) BY ORAL ROUTE 2 TIMES PER DAY PRN ARTHRITIS PAIN      dicyclomine (BENTYL) 20 mg tablet TAKE ONE TABLET BY MOUTH EVERY 6 HOURS FOR 5 DAYS      gabapentin (NEURONTIN) 100 mg capsule Take 100 mg by mouth 3 (three) times a day      levothyroxine 25 mcg tablet TAKE 1 TABLET (25 MCG) BY ORAL ROUTE ONCE DAILY AN HOUR BEFORE BREAKFAST-TAKE WITH WATER      Melatonin 5 MG TABS TAKE 1 TABLET(S) EVERY DAY BY ORAL ROUTE AT DINNER FOR 90 DAYS   omeprazole (PriLOSEC) 20 mg delayed release capsule Take 20 mg by mouth daily      simvastatin (ZOCOR) 40 mg tablet TAKE 1 TABLET (40 MG) BY ORAL ROUTE ONCE DAILY IN THE EVENING FOR 30 DAYS      triamcinolone (KENALOG) 0 1 % oral topical paste APPLY IN MOUTH TOPICALLY TO ULCER AFTER EATING AND DRINKING THREE TIMES DAILY AS NEEDED      triamcinolone (KENALOG) 0 1 % cream APPLY A THIN LAYER TO THE AFFECTED AREA(S) TWICE DAILY AS NEEDED       No current facility-administered medications for this visit  Social History:  -Employment: homemaker  -Smoking: no  -Caffeine: 8 oz of coffee  -Alcohol: no  -THC: no  -OTC/Supplements/herbals:   -Illicits:  denies  -Family: lives with     ROS:  Genitourinary need to urinate more than twice a night and post menopausal (no peroids)   Cardiology none   Gastrointestinal abdominal pain or cramping that disturb sleep    Neurology muscle weakness   Constitutional claustrophobia   Integumentary none   Psychiatry none   Musculoskeletal joint pain, muscle aches, back pain, legs twitching/jerking, sciatica and leg cramps   Pulmonary snoring   ENT none   Endocrine frequent urination   Hematological blood donor       MSE:  -Alert and appropriate: alert, calm, cooperative  -Oriented to person, place and time:  name, age, location, day/date/mon/yr  -Behavior: good, sustained eye contact  -Speech: Unremarkable rate/rhythm/volume  -Mood: "good"  -Affect: full range  -Thought Processes: linear, logical, goal directed  PE:  Body mass index is 31 53 kg/m²    Vitals:    08/21/20 0800   BP: 118/70   Pulse: 74   Weight: 80 7 kg (178 lb)   Height: 5' 3" (1 6 m)       -General:  In NAD    -Eyes: Conjunctival injection: none     -EOM:  PERRLA, EOMI   -Eyelid hooding: no    -ENT: MP: 4/4   -Facial deformity: no retrognathia   -Hard palate: moderate arch   -Soft palate: crowding with small airway, narrow opening, elongated uvula   -Gums and teeth: normal dentition   -Tongue: wide with  Scalloping   -Nares:  Patent    -Neck/Lymphatics: Lymphadenopathy:  none appreciated   -Masses:  none appreciated   -Circumference: Neck Circumference: 15 5"    -Cardiac: Auscultation:  RRR   - LE edema over shins: none appreciated    -Pulm: -Respirations: unlaboured         -Auscultation:  CTA bilaterally, posterior fields    -Neuro: No resting tremor     -Musculoskeletal: Gait and stance: normal turning and ambulation; unremarkable  Assessment:  Ms Maurice Barlow is a 56 y o  female who is seen to evaluate for possible obstructive sleep apnea  Given the patient's symptoms of snoring in the context of a narrow airway, large tongue, and a crowded OP, a diagnosis of obstructive sleep apnea is likely  The pathophysiology of, the reasons to treat and treatment options for obstructive sleep apnea were all reviewed with the patient today  Discussed the testing options and reviewed the benefits and downsides of both, patient opted for the in lab testing  She is amenable to treatment with PAP therapy  Discussed keeping nasal passages clear, abstaining from alcohol, and other sedating drugs at night- which will worsen symptoms of MARI  She also has had insomnia since being sick with COVID in April, will try trazodone 25-50mg at bedtime  --History provided by: patient   --Records reviewed: in chart      Recommendations:  1) In lab diagnostic Polysomnography   2) Driving safety was reviewed with patient  If the patient feels too sleepy to drive she knows not to drive  If she becomes sleepy while driving she will pull over and nap  3) Follow-up after initiation of treatment  4) Call with any questions or concerns     5) Trazodone 25-50mg at bedtime    All questions answered for the patient, who indicated understanding and agreed with the plan       Jordy Almanza MD  Psychiatry/ Sleep medicine

## 2020-08-22 NOTE — PROGRESS NOTES
Sleep Study Documentation    Pre-Sleep Study       Sleep testing procedure explained to patient:YES    Patient napped prior to study:NO    Caffeine:Dayshift worker after 12PM   Caffeine use:NO    Alcohol:Dayshift workers after 5PM: Alcohol use:NO    Typical day for patient:YES       Study Documentation    Sleep Study Indications: INSOMNIA, SNORING, FIBROMYALGIA    Sleep Study: Diagnostic   Snore: Moderate  Supplemental O2: no    O2 flow rate (L/min) range   O2 flow rate (L/min) final   Minimum SaO2 71%  Baseline SaO2 95%        Mode of Therapy:    EKG abnormalities: no     EEG abnormalities: no    Sleep Study Recorded < 2 hours: N/A    Sleep Study Recorded > 2 hours but incomplete study: N/A    Sleep Study Recorded 6 hours but no sleep obtained: NO    Patient classification: unemployed       Post-Sleep Study    Medication used at bedtime or during sleep study:YES prescription sleep aid    Patient reports time it took to fall asleep:20 to 30 minutes    Patient reports waking up during study:3 or more times  Patient reports returning to sleep in greater than 30 minutes  Patient reports sleeping 4 to 6 hours without dreaming  Patient reports sleep during study:typical    Patient rated sleepiness: Somewhat sleepy or tired    PAP treatment:no  PT TOOK SLEEP AID

## 2020-08-26 ENCOUNTER — TELEPHONE (OUTPATIENT)
Dept: SLEEP CENTER | Facility: CLINIC | Age: 60
End: 2020-08-26

## 2020-08-26 DIAGNOSIS — G47.33 OSA (OBSTRUCTIVE SLEEP APNEA): Primary | ICD-10-CM

## 2020-08-26 NOTE — TELEPHONE ENCOUNTER
----- Message from Samantha Lofton MD sent at 8/26/2020  9:46 AM EDT -----  PSG read  APAP ordered  Follow up in 6-8 weeks

## 2020-08-26 NOTE — TELEPHONE ENCOUNTER
Via  #[id number]  Patient set up for DME set up and compliance appointments     Appointment information emailed to dominique

## 2020-09-11 ENCOUNTER — TELEPHONE (OUTPATIENT)
Dept: SLEEP CENTER | Facility: CLINIC | Age: 60
End: 2020-09-11

## 2020-09-16 ENCOUNTER — TELEPHONE (OUTPATIENT)
Dept: SLEEP CENTER | Facility: CLINIC | Age: 60
End: 2020-09-16

## 2020-09-16 NOTE — TELEPHONE ENCOUNTER
Via  # [id number]    Returned the patient's call  She left message about her pressure settings not sure if she feels the pressure is too high or too low     Will obtain compliance data for Dr Omaira Godoy to review Left call back message for more information

## 2020-09-17 ENCOUNTER — TRANSCRIBE ORDERS (OUTPATIENT)
Dept: ADMINISTRATIVE | Facility: HOSPITAL | Age: 60
End: 2020-09-17

## 2020-09-17 ENCOUNTER — TELEPHONE (OUTPATIENT)
Dept: SLEEP CENTER | Facility: CLINIC | Age: 60
End: 2020-09-17

## 2020-09-17 ENCOUNTER — OFFICE VISIT (OUTPATIENT)
Dept: SLEEP CENTER | Facility: CLINIC | Age: 60
End: 2020-09-17
Payer: MEDICARE

## 2020-09-17 VITALS
WEIGHT: 176.13 LBS | BODY MASS INDEX: 31.21 KG/M2 | SYSTOLIC BLOOD PRESSURE: 100 MMHG | HEIGHT: 63 IN | DIASTOLIC BLOOD PRESSURE: 68 MMHG | HEART RATE: 68 BPM

## 2020-09-17 DIAGNOSIS — G47.33 OSA (OBSTRUCTIVE SLEEP APNEA): Primary | ICD-10-CM

## 2020-09-17 DIAGNOSIS — Z12.31 ENCOUNTER FOR SCREENING MAMMOGRAM FOR MALIGNANT NEOPLASM OF BREAST: Primary | ICD-10-CM

## 2020-09-17 PROCEDURE — 99214 OFFICE O/P EST MOD 30 MIN: CPT | Performed by: PSYCHIATRY & NEUROLOGY

## 2020-09-17 NOTE — PATIENT INSTRUCTIONS
Recommendations:    1) APAP at 4-10cm with mask fitting  2) Safe driving reviewed  3) Follow-up 6-8 weeks  4) Call with any questions or concerns

## 2020-09-17 NOTE — PROGRESS NOTES
Sleep Medicine Follow-Up Note    HPI: 63yo F with MARI presenting for a follow up  Treatment Summary: PSG 2020: apnea/hypopnea index (AHI) of 18 6 events per hour of sleep   The AHI in the supine position was 18  6   The AHI during REM sleep was 53 7  APAP 5-15cm recommended  HPI:   Today, patient presents accompanied by her   She is having a lot of trouble using her mask and machine a she has a lot of pressure in her head and wakes up with a headache  She uses the ramp and this is better for her  She only has had this machine for 1 weeks  Treatment: APAP  -Pressure: 5-15cm   -Pressure intolerance: yes   -Aerophagia: no   -Air Hunger: no  -Interface: FFM  -Fit: poor  -Chin strap: no  -HCC: YME  -Patient's perceived outcome: not helping  Compliance Card Data:    - Date Range: 20-9/15/20   - Settin-15cm   - Pressure: Median 5 7; 90th%ile = 7 3   - Residual AHI: 1 5   - %  Night in Large Leak: 1h 9m    - Average usage days used: 5h 51m   - % Days used: 20%   - % Days with Usage > 4 hrs: 13%        ROS:   Genitourinary need to urinate more than twice a night   Cardiology none   Gastrointestinal none   Neurology none   Constitutional none   Integumentary none   Psychiatry none   Musculoskeletal none   Pulmonary none   ENT ringing in ears   Endocrine none   Hematological none           --> Denies any significant medical changes since last visit  --> Supplemental Oxygen Use: denies    Questionnaire:  Sitting and reading: Would never doze  Watching TV: Would never doze  Sitting, inactive in a public place (e g  a theatre or a meeting):  Would never doze  As a passenger in a car for an hour without a break: Would never doze  Lying down to rest in the afternoon when circumstances permit: Would never doze  Sitting and talking to someone: Would never doze  Sitting quietly after a lunch without alcohol: Would never doze  In a car, while stopped for a few minutes in traffic: Would never doze  Total score: 0      PE:    /68   Pulse 68   Ht 5' 3" (1 6 m)   Wt 79 9 kg (176 lb 2 oz)   BMI 31 20 kg/m²     General:  In NAD  Pul: Respirations: unlaboured  MS: No atrophy  Neuro: No resting tremor  Gait normal turning & station; unremarkable overall  Psych: Socially appropriate  Pleasant  No overt dysphoria  Assessment: The patient has trouble with the pressure and mask  Will lower it to start at 4cm and have her meet with YME to  Address mask leak  Recommendations:    1) APAP at 4-10cm with mask fitting  2) Safe driving reviewed  3) Follow-up 6-8 weeks  4) Call with any questions or concerns  All questions answered for the patient and , who indicated understanding and agreed with the plan       Meghna Carlos MD  Psychiatry/ Sleep medicine

## 2020-09-22 ENCOUNTER — HOSPITAL ENCOUNTER (OUTPATIENT)
Dept: RADIOLOGY | Age: 60
Discharge: HOME | End: 2020-09-22

## 2020-09-22 DIAGNOSIS — Z12.31 ENCOUNTER FOR SCREENING MAMMOGRAM FOR MALIGNANT NEOPLASM OF BREAST: ICD-10-CM

## 2020-09-30 ENCOUNTER — HOSPITAL ENCOUNTER (OUTPATIENT)
Dept: MAMMOGRAPHY | Facility: CLINIC | Age: 60
Discharge: HOME | End: 2020-09-30
Payer: MEDICARE

## 2020-09-30 ENCOUNTER — HOSPITAL ENCOUNTER (OUTPATIENT)
Dept: ULTRASOUND IMAGING | Facility: CLINIC | Age: 60
Discharge: HOME | End: 2020-09-30
Payer: MEDICARE

## 2020-09-30 VITALS — HEIGHT: 63 IN | BODY MASS INDEX: 31.18 KG/M2 | WEIGHT: 176 LBS

## 2020-09-30 DIAGNOSIS — R92.8 ABNORMAL MAMMOGRAM: ICD-10-CM

## 2020-09-30 PROCEDURE — 77066 DX MAMMO INCL CAD BI: CPT

## 2020-09-30 PROCEDURE — 76642 ULTRASOUND BREAST LIMITED: CPT

## 2020-09-30 PROCEDURE — G0279 TOMOSYNTHESIS, MAMMO: HCPCS

## 2020-10-29 ENCOUNTER — TRANSCRIBE ORDERS (OUTPATIENT)
Dept: ADMINISTRATIVE | Facility: HOSPITAL | Age: 60
End: 2020-10-29

## 2020-10-29 DIAGNOSIS — K76.9 LIVER DISEASE: ICD-10-CM

## 2020-10-29 DIAGNOSIS — K76.9 LIVER DISEASE, UNSPECIFIED: Primary | ICD-10-CM

## 2020-11-06 DIAGNOSIS — R06.83 SNORING: ICD-10-CM

## 2020-11-06 DIAGNOSIS — G47.00 INSOMNIA, UNSPECIFIED TYPE: ICD-10-CM

## 2020-11-06 RX ORDER — TRAZODONE HYDROCHLORIDE 50 MG/1
50 TABLET ORAL
Qty: 30 TABLET | Refills: 1 | Status: SHIPPED | OUTPATIENT
Start: 2020-11-06 | End: 2021-01-18

## 2020-11-07 ENCOUNTER — HOSPITAL ENCOUNTER (OUTPATIENT)
Dept: RADIOLOGY | Facility: HOSPITAL | Age: 60
Discharge: HOME | End: 2020-11-07
Payer: MEDICARE

## 2020-11-07 DIAGNOSIS — K76.9 LIVER DISEASE: ICD-10-CM

## 2020-11-07 PROCEDURE — 76705 ECHO EXAM OF ABDOMEN: CPT

## 2020-11-19 ENCOUNTER — LAB (OUTPATIENT)
Dept: LAB | Facility: HOSPITAL | Age: 60
End: 2020-11-19
Payer: MEDICARE

## 2020-11-19 DIAGNOSIS — K76.9 LIVER DISEASE, UNSPECIFIED: ICD-10-CM

## 2020-11-19 DIAGNOSIS — K76.9 LIVER DISEASE: ICD-10-CM

## 2020-11-19 LAB
FERRITIN SERPL-MCNC: 97 NG/ML (ref 8–388)
HAV AB SER QL IA: NORMAL
HAV IGM SER QL: NORMAL
HBV CORE AB SER QL: NORMAL
HBV CORE IGM SER QL: NORMAL
HBV SURFACE AB SER-ACNC: <3.1 MIU/ML
HBV SURFACE AG SER QL: NORMAL
HCV AB SER QL: NORMAL
INR PPP: 0.93 (ref 0.84–1.19)
PROTHROMBIN TIME: 12.5 SECONDS (ref 11.6–14.5)

## 2020-11-19 PROCEDURE — 82728 ASSAY OF FERRITIN: CPT

## 2020-11-19 PROCEDURE — 85610 PROTHROMBIN TIME: CPT

## 2020-11-19 PROCEDURE — 80074 ACUTE HEPATITIS PANEL: CPT

## 2020-11-19 PROCEDURE — 86708 HEPATITIS A ANTIBODY: CPT

## 2020-11-19 PROCEDURE — 86706 HEP B SURFACE ANTIBODY: CPT

## 2020-11-19 PROCEDURE — 36415 COLL VENOUS BLD VENIPUNCTURE: CPT

## 2020-11-19 PROCEDURE — 86704 HEP B CORE ANTIBODY TOTAL: CPT

## 2020-11-23 ENCOUNTER — OFFICE VISIT (OUTPATIENT)
Dept: SLEEP CENTER | Facility: CLINIC | Age: 60
End: 2020-11-23
Payer: MEDICARE

## 2020-11-23 VITALS
WEIGHT: 180.4 LBS | OXYGEN SATURATION: 97 % | DIASTOLIC BLOOD PRESSURE: 84 MMHG | HEART RATE: 68 BPM | HEIGHT: 63 IN | BODY MASS INDEX: 31.96 KG/M2 | SYSTOLIC BLOOD PRESSURE: 120 MMHG

## 2020-11-23 DIAGNOSIS — G47.33 OSA (OBSTRUCTIVE SLEEP APNEA): Primary | ICD-10-CM

## 2020-11-23 PROCEDURE — 99214 OFFICE O/P EST MOD 30 MIN: CPT | Performed by: PSYCHIATRY & NEUROLOGY

## 2020-11-23 RX ORDER — FAMOTIDINE 20 MG/1
TABLET, FILM COATED ORAL
COMMUNITY
Start: 2020-10-29

## 2020-11-24 ENCOUNTER — TELEPHONE (OUTPATIENT)
Dept: SLEEP CENTER | Facility: CLINIC | Age: 60
End: 2020-11-24

## 2020-12-01 ENCOUNTER — TELEPHONE (OUTPATIENT)
Dept: SLEEP CENTER | Facility: CLINIC | Age: 60
End: 2020-12-01

## 2021-01-18 DIAGNOSIS — G47.00 INSOMNIA, UNSPECIFIED TYPE: ICD-10-CM

## 2021-01-18 DIAGNOSIS — R06.83 SNORING: ICD-10-CM

## 2021-01-18 RX ORDER — TRAZODONE HYDROCHLORIDE 50 MG/1
50 TABLET ORAL
Qty: 30 TABLET | Refills: 1 | Status: SHIPPED | OUTPATIENT
Start: 2021-01-18 | End: 2021-02-24

## 2021-02-24 DIAGNOSIS — R06.83 SNORING: ICD-10-CM

## 2021-02-24 DIAGNOSIS — G47.00 INSOMNIA, UNSPECIFIED TYPE: ICD-10-CM

## 2021-02-24 RX ORDER — TRAZODONE HYDROCHLORIDE 50 MG/1
50 TABLET ORAL
Qty: 30 TABLET | Refills: 1 | Status: SHIPPED | OUTPATIENT
Start: 2021-02-24 | End: 2021-04-26

## 2021-02-27 PROBLEM — K14.6 BURNING TONGUE SYNDROME: Status: ACTIVE | Noted: 2021-02-27

## 2021-03-16 ENCOUNTER — EVALUATION (OUTPATIENT)
Dept: PHYSICAL THERAPY | Facility: REHABILITATION | Age: 61
End: 2021-03-16
Payer: MEDICARE

## 2021-03-16 ENCOUNTER — TRANSCRIBE ORDERS (OUTPATIENT)
Dept: PHYSICAL THERAPY | Facility: REHABILITATION | Age: 61
End: 2021-03-16

## 2021-03-16 DIAGNOSIS — M54.16 LUMBAR RADICULOPATHY: Primary | ICD-10-CM

## 2021-03-16 DIAGNOSIS — M54.16 RADICULOPATHY, LUMBAR REGION: Primary | ICD-10-CM

## 2021-03-16 PROCEDURE — 97163 PT EVAL HIGH COMPLEX 45 MIN: CPT | Performed by: PHYSICAL THERAPIST

## 2021-03-16 PROCEDURE — 97112 NEUROMUSCULAR REEDUCATION: CPT | Performed by: PHYSICAL THERAPIST

## 2021-03-16 NOTE — PROGRESS NOTES
PT Evaluation     Today's date: 3/16/2021  Patient name: Elroy River  : 1960  MRN: [id number]  Referring provider: Bailey Álvarez DO  Dx:   Encounter Diagnosis     ICD-10-CM    1  Lumbar radiculopathy  M54 16        Start Time:   Stop Time: 1500  Total time in clinic (min): 45 minutes    Assessment  Assessment details: Elroy River is a 59 y o  female presenting with acute LBP with L sided paresthesia  Pt demonstrates excellent response to initial tx with complete centralization and abolition of pain with extension program  Daughter acted as   Primary impairments include decreased ROM, pain, and poor motor control  Will benefit from skilled PT interventions for return to PLOF  Impairments: abnormal coordination, abnormal gait, abnormal muscle tone, abnormal or restricted ROM, abnormal movement, activity intolerance, difficulty understanding, impaired physical strength, lacks appropriate home exercise program, pain with function and poor body mechanics  Functional limitations: decreased sitting, driving, lifting, bending  Symptom irritability: highUnderstanding of Dx/Px/POC: good   Prognosis: good    Goals    Short Term Goals: In 4 weeks, the patient will:  1  Full pain free ext ROM  2  No s/s past knee for > 4weeks  3  Supervision with HEP for self care    Long Term Goals: In 8 weeks, the patient will:  1  Sitting tolerance to WNL  2  FOTO increase by >15  3   Independent with HEP for selfcare      Plan  Patient would benefit from: skilled physical therapy  Planned modality interventions: TENS  Planned therapy interventions: joint mobilization, manual therapy, massage, neuromuscular re-education, patient education, self care, strengthening, stretching, therapeutic activities, therapeutic exercise, home exercise program, graded exercise, graded activity, functional ROM exercises, flexibility and activity modification  Frequency: 2x week  Duration in weeks: 8  Treatment plan discussed with: patient and family        Subjective   Pain Location: LBP with L L4 dermatomal distal s/s  Pain Intensity:  10/10  ADRIENNE: following prolonged sitting and bending to brush teeth  DOI: 2 weeks ago  Provoked by: sitting, flexion, bending  Eased Positions: prone  Constitutional S/S: denies   Goals: pain free sitting and IADLs    Objective     Postural Findings:     Head Position x Protracted  Neutral  Retracted   Scapular Position x Protracted  Neutral  Retracted   Thoracic Spine x Inc Kyphosis  Neutral     Lumbar Spine  Inc Lordosis  Neutral x Dec Lordosis   Pelvis  Anterior Tilt  Neutral x Posterior Tilt   Lateral Shift  Right  Left x None     Strength and ROM evaluated B from a regional biomechanical perspective and values relevant to this episode recorded in tables below  ROM:   Joint / Motion  Right  Left    Lumbar Flexion  50*     Lumbar Extension  0*     Lumbar Sidebending  WNl WNL   Lumbar rotation WNL WNL   Hip ER  WNL  WNL    Hip IR  WNL WNL     Repeated Movements:    Lying Baseline: 6      DURING MVMT  RESPONSE AFTER  Lying Flexion NT NT   Lying Extension Centralized better     Standing Baseline: 0      DURING MVMT    RESPONSE AFTER  Standing Flexion worse worse   Standing Extension Increased ROM better         LE Myotomes:  Nerve root Test Action RIGHT  LEFT   L1-L2 Hip Flexion WNL WNL   L3 Knee Extension WNL WNL   L4  Ankle DF WNL WNL   L5 Great toe Extension WNL WNL   S1 Ankle PF, ankle eversion, hip extension, knee flexion WNL     WNL   S2 Knee Flexion WNL WNL     Additional Assessments:  Pain with palpation noted: TTP L4-S1 paraspinales  Joint Mobility: guarded with Pas throughout, pain with L/S      Special Tests:  Lumbar Specific and Neural Tension                                                                            Test / Measure  Right 3/16/2021 Left 3/16/2021   Straight Leg raise     Crossed straight leg raise     Slump test     Prone instability test N N     SI Joint Screen: No Presence of Inominate asymmetry, (P) March Test, TTP posterior SIJ ligaments      Pertinent Findings:                                    Test / Measure  3/16/2021   FOTO 53 5   Ext ROM 0*          Precautions: Requires       Manuals 3/16                                                                Neuro Re-Ed             Prone Prop 4'            Prone Press up x20            Standing ext x20                                                                Ther Ex                                                                                                                     Ther Activity                                       Gait Training                                       Modalities

## 2021-03-30 ENCOUNTER — APPOINTMENT (OUTPATIENT)
Dept: PHYSICAL THERAPY | Facility: REHABILITATION | Age: 61
End: 2021-03-30
Payer: MEDICARE

## 2021-03-31 ENCOUNTER — APPOINTMENT (OUTPATIENT)
Dept: PHYSICAL THERAPY | Facility: REHABILITATION | Age: 61
End: 2021-03-31
Payer: MEDICARE

## 2021-04-05 ENCOUNTER — OFFICE VISIT (OUTPATIENT)
Dept: PHYSICAL THERAPY | Facility: REHABILITATION | Age: 61
End: 2021-04-05
Payer: MEDICARE

## 2021-04-05 DIAGNOSIS — M54.16 LUMBAR RADICULOPATHY: Primary | ICD-10-CM

## 2021-04-05 PROCEDURE — 97112 NEUROMUSCULAR REEDUCATION: CPT

## 2021-04-05 PROCEDURE — 97110 THERAPEUTIC EXERCISES: CPT

## 2021-04-05 NOTE — PROGRESS NOTES
Daily Note     Today's date: 2021  Patient name: Miracle Lagos  : 1960  MRN: [id number]  Referring provider: Misty Peraza DO  Dx:   Encounter Diagnosis     ICD-10-CM    1  Lumbar radiculopathy  M54 16                   Subjective: Pt reports no pain today  Objective: See treatment diary below      Assessment: Tolerated treatment well  Pt required some verbal and tactile cueing for stabilization this visit  Patient exhibited good technique with therapeutic exercises and would benefit from continued PT      Plan: Progress treatment as tolerated         Precautions: Requires       Manuals 3/16 4/5                                                               Neuro Re-Ed             Prone Prop 4'            Prone Press up x20 20x           Standing ext x20 2x20 with otb           Standing lumbar ext with towel OP  2x10                                                  Ther Ex             Nu step  10'           Bridges  3x10           Supermans  3x10 ea           Squats  3x10           Quad hip ext  2x10                                                  Ther Activity                                       Gait Training                                       Modalities

## 2021-04-08 ENCOUNTER — APPOINTMENT (OUTPATIENT)
Dept: PHYSICAL THERAPY | Facility: REHABILITATION | Age: 61
End: 2021-04-08
Payer: MEDICARE

## 2021-04-14 ENCOUNTER — HOSPITAL ENCOUNTER (EMERGENCY)
Facility: HOSPITAL | Age: 61
Discharge: HOME | End: 2021-04-14
Attending: EMERGENCY MEDICINE | Admitting: EMERGENCY MEDICINE
Payer: MEDICARE

## 2021-04-14 VITALS
WEIGHT: 182.1 LBS | DIASTOLIC BLOOD PRESSURE: 75 MMHG | RESPIRATION RATE: 20 BRPM | HEART RATE: 93 BPM | SYSTOLIC BLOOD PRESSURE: 146 MMHG | OXYGEN SATURATION: 98 % | BODY MASS INDEX: 32.26 KG/M2 | TEMPERATURE: 98.3 F

## 2021-04-14 DIAGNOSIS — M54.50 ACUTE EXACERBATION OF CHRONIC LOW BACK PAIN: Primary | ICD-10-CM

## 2021-04-14 DIAGNOSIS — G89.29 ACUTE EXACERBATION OF CHRONIC LOW BACK PAIN: Primary | ICD-10-CM

## 2021-04-14 PROCEDURE — 99284 EMERGENCY DEPT VISIT MOD MDM: CPT | Performed by: EMERGENCY MEDICINE

## 2021-04-14 PROCEDURE — 96372 THER/PROPH/DIAG INJ SC/IM: CPT

## 2021-04-14 PROCEDURE — 99283 EMERGENCY DEPT VISIT LOW MDM: CPT

## 2021-04-14 RX ORDER — KETOROLAC TROMETHAMINE 30 MG/ML
15 INJECTION, SOLUTION INTRAMUSCULAR; INTRAVENOUS ONCE
Status: COMPLETED | OUTPATIENT
Start: 2021-04-14 | End: 2021-04-14

## 2021-04-14 RX ORDER — METHOCARBAMOL 500 MG/1
1000 TABLET, FILM COATED ORAL ONCE
Status: COMPLETED | OUTPATIENT
Start: 2021-04-14 | End: 2021-04-14

## 2021-04-14 RX ORDER — ACETAMINOPHEN 325 MG/1
975 TABLET ORAL ONCE
Status: COMPLETED | OUTPATIENT
Start: 2021-04-14 | End: 2021-04-14

## 2021-04-14 RX ORDER — METHOCARBAMOL 500 MG/1
1000 TABLET, FILM COATED ORAL
Qty: 20 TABLET | Refills: 0 | Status: SHIPPED | OUTPATIENT
Start: 2021-04-14

## 2021-04-14 RX ADMIN — KETOROLAC TROMETHAMINE 15 MG: 30 INJECTION, SOLUTION INTRAMUSCULAR at 11:18

## 2021-04-14 RX ADMIN — ACETAMINOPHEN 975 MG: 325 TABLET ORAL at 11:18

## 2021-04-14 RX ADMIN — METHOCARBAMOL 1000 MG: 500 TABLET, FILM COATED ORAL at 11:18

## 2021-04-14 NOTE — ED ATTENDING ATTESTATION
4/14/2021  I, Monalisa Jj MD, saw and evaluated the patient  I have discussed the patient with the resident/non-physician practitioner and agree with the resident's/non-physician practitioner's findings, Plan of Care, and MDM as documented in the resident's/non-physician practitioner's note, except where noted  All available labs and Radiology studies were reviewed  I was present for key portions of any procedure(s) performed by the resident/non-physician practitioner and I was immediately available to provide assistance  At this point I agree with the current assessment done in the Emergency Department  I have conducted an independent evaluation of this patient a history and physical is as follows:    ED Course         Critical Care Time  Procedures    60 yo female with hx of lumbar radiculopathy, having acute worsening of pain in low back over the last few weeks  Pt pain radiates to leg  No numbness, tingling, weakness, pt able to ambulate  Pt taking steroids for pain currently given by pcp  Pt tried pt with no relief  Vss, afebrile, lungs cta, rrr, abdomen soft nontender, lumbar tenderness, positive straight leg raise  msk pain, pain meds   Comprehensive spine referral

## 2021-04-14 NOTE — Clinical Note
Wilbert Ngo was seen and treated in our emergency department on 4/14/2021  Diagnosis: Back Pain    Regina    She may return on this date: If you have any questions or concerns, please don't hesitate to call        Randee Laird DO    ______________________________           _______________          _______________  Hospital Representative                              Date                                Time

## 2021-04-14 NOTE — DISCHARGE INSTRUCTIONS
You have been seen for low back pain  Please take tylenol and continue your gabapentin  Return to the emergency department if you develop worsening pain, weakness, incontinence or any other symptoms of concern  Please follow up with comprehensive spine by calling the number provided

## 2021-04-14 NOTE — ED PROVIDER NOTES
History  Chief Complaint   Patient presents with    Back Pain     Patient reports a hx of sciatica nerve pain; reports that it is on the left side and goes down her leg; ongoing issue and was given medications for it but not fully helping     Benjamín Deal is a 59y o  year old female with PMH of low back pain and lumbar radiculopathy presenting to the [facility] ED for back pain  Patient has had 1 5 months of low back pain  Radiating to left leg  Pain is unchanged today though persistent prompting ED evaluatoin  Patient denies associated abdominal pain, dysuria/hematuria, N/V/D  The patient has taken previously prescribed prednisone, valium and lidocaine patch at home for symptomatic treatment  History notable for known diagnosis of lumbar radiculopathy and is following PT as outpatient  Patient denies bowel/bladder incontinence, saddle anesthesia  Patient denies recent trauma to the area  Denies unexplained fever/night sweats, weight loss, neurologic symptoms, urinary retention  No history of bacteremia  Denies Hx of IVDU  No history of cancer (breast, renal, lung)  History provided by:  Patient   used: Yes    Back Pain  Associated symptoms: no abdominal pain, no chest pain, no dysuria, no fever and no weakness        Prior to Admission Medications   Prescriptions Last Dose Informant Patient Reported? Taking? Cholecalciferol (Vitamin D) 50 MCG (2000 UT) tablet   Yes No   Sig: ONE DAILY FOR VITAMIN D SUPPLEMENT   Melatonin 5 MG TABS   Yes No   Sig: TAKE 1 TABLET(S) EVERY DAY BY ORAL ROUTE AT DINNER FOR 90 DAYS     alendronate (FOSAMAX) 70 mg tablet   Yes No   Sig: Take 70 mg by mouth every 7 days   ascorbic acid (VITAMIN C) 500 mg tablet   Yes No   Sig: Take 500 mg by mouth daily   famotidine (PEPCID) 20 mg tablet   Yes No   Sig: TAKE 1 TABLET(S) TWICE A DAY BY ORAL ROUTE    gabapentin (NEURONTIN) 100 mg capsule   Yes No   Sig: Take 100 mg by mouth 3 (three) times a day lansoprazole (PREVACID) 30 mg capsule   Yes No   Sig: Take 30 mg by mouth daily   levothyroxine 25 mcg tablet   Yes No   Sig: TAKE 1 TABLET (25 MCG) BY ORAL ROUTE ONCE DAILY AN HOUR BEFORE BREAKFAST-TAKE WITH WATER   naproxen (NAPROSYN) 500 mg tablet   Yes No   Sig: Take 500 mg by mouth 2 (two) times a day with meals   omeprazole (PriLOSEC) 20 mg delayed release capsule   Yes No   Sig: Take 20 mg by mouth daily   traZODone (DESYREL) 50 mg tablet   No No   Sig: TAKE 1 TABLET (50 MG TOTAL) BY MOUTH DAILY AT BEDTIME      Facility-Administered Medications: None       Past Medical History:   Diagnosis Date    Arthritis     Arthritis     Fatty liver     Heartburn     Hypercholesteremia     Hypothyroidism     Muscle disease     Osteoporosis        Past Surgical History:   Procedure Laterality Date    BREAST EXCISIONAL BIOPSY Left 2015    in [facility]    BREAST EXCISIONAL BIOPSY Left 2016    in [facility]      @ age 38       Family History   Problem Relation Age of Onset    No Known Problems Sister     No Known Problems Daughter     Breast cancer Half-Sister     No Known Problems Sister      I have reviewed and agree with the history as documented  E-Cigarette/Vaping    E-Cigarette Use Never User      E-Cigarette/Vaping Substances    Nicotine No     THC No     CBD No     Flavoring No     Other No     Unknown No      Social History     Tobacco Use    Smoking status: Never Smoker    Smokeless tobacco: Never Used   Substance Use Topics    Alcohol use: Never     Frequency: Never    Drug use: Never        Review of Systems   Constitutional: Negative for chills and fever  HENT: Negative for congestion and rhinorrhea  Eyes: Negative for visual disturbance  Respiratory: Negative for cough and shortness of breath  Cardiovascular: Negative for chest pain and leg swelling     Gastrointestinal: Negative for abdominal distention, abdominal pain, diarrhea, nausea and vomiting  Endocrine: Negative for polyuria  Genitourinary: Negative for dysuria, flank pain and hematuria  Musculoskeletal: Positive for back pain  Negative for arthralgias  Skin: Negative for rash  Neurological: Negative for syncope, weakness and light-headedness  Psychiatric/Behavioral: Negative for behavioral problems and confusion  All other systems reviewed and are negative  Physical Exam  ED Triage Vitals   Temperature Pulse Respirations Blood Pressure SpO2   04/14/21 1030 04/14/21 1027 04/14/21 1027 04/14/21 1027 04/14/21 1027   98 3 °F (36 8 °C) 93 20 146/75 98 %      Temp Source Heart Rate Source Patient Position - Orthostatic VS BP Location FiO2 (%)   04/14/21 1030 04/14/21 1027 04/14/21 1027 04/14/21 1027 --   Tympanic Monitor Lying Right arm       Pain Score       04/14/21 1030       7             Orthostatic Vital Signs  Vitals:    04/14/21 1027   BP: 146/75   Pulse: 93   Patient Position - Orthostatic VS: Lying       Physical Exam  Vitals signs and nursing note reviewed  Constitutional:       General: She is not in acute distress  Appearance: Normal appearance  She is well-developed  She is not ill-appearing, toxic-appearing or diaphoretic  HENT:      Head: Normocephalic and atraumatic  Nose: No congestion or rhinorrhea  Eyes:      General:         Right eye: No discharge  Left eye: No discharge  Neck:      Musculoskeletal: Normal range of motion and neck supple  No neck rigidity  Cardiovascular:      Rate and Rhythm: Normal rate and regular rhythm  Pulmonary:      Effort: Pulmonary effort is normal  No accessory muscle usage or respiratory distress  Breath sounds: Normal breath sounds  No stridor  No decreased breath sounds, wheezing, rhonchi or rales  Abdominal:      General: Bowel sounds are normal  There is no distension  Palpations: Abdomen is soft  Tenderness: There is no abdominal tenderness   There is no guarding or rebound  Musculoskeletal:      Lumbar back: She exhibits no tenderness  Right lower leg: She exhibits no tenderness  No edema  Left lower leg: She exhibits no tenderness  No edema  Skin:     Capillary Refill: Capillary refill takes less than 2 seconds  Findings: No rash  Neurological:      Mental Status: She is alert and oriented to person, place, and time  GCS: GCS eye subscore is 4  GCS verbal subscore is 5  GCS motor subscore is 6  Comments: 5/5 strength b/l LE  Sensation grossly intact  POSITIVE straight leg raise left leg, negative right leg   Psychiatric:         Mood and Affect: Mood normal          Behavior: Behavior normal          ED Medications  Medications   acetaminophen (TYLENOL) tablet 975 mg (975 mg Oral Given 4/14/21 1118)   ketorolac (TORADOL) injection 15 mg (15 mg Intramuscular Given 4/14/21 1118)   methocarbamol (ROBAXIN) tablet 1,000 mg (1,000 mg Oral Given 4/14/21 1118)       Diagnostic Studies  Results Reviewed     None                 No orders to display         Procedures  Procedures      ED Course                             SBIRT 20yo+      Most Recent Value   SBIRT (24 yo +)   In order to provide better care to our patients, we are screening all of our patients for alcohol and drug use  Would it be okay to ask you these screening questions? No Filed at: 04/14/2021 1115                MDM  Number of Diagnoses or Management Options  Acute exacerbation of chronic low back pain:   Diagnosis management comments:   59 y o  female presenting for low back pain  VSS  Will treat with multimodal analgesia  Reassessment: Pain slightly improved though still present  I have discussed with the patient our plan to discharge them from the ED and the patient is in agreement with this plan  The patient was provided a written after visit summary with strict RTED precautions  Discharge Plan: Rx for robaxin, comprehensive spine f/u      Followup: I have discussed with the patient plan to follow up with their PCP  Contact information provided in AVS        Amount and/or Complexity of Data Reviewed  Review and summarize past medical records: yes    Patient Progress  Patient progress: improved      Disposition  Final diagnoses:   Acute exacerbation of chronic low back pain     Time reflects when diagnosis was documented in both MDM as applicable and the Disposition within this note     Time User Action Codes Description Comment    4/14/2021 11:40 AM Bairon Garcia [M54 5,  G89 29] Acute exacerbation of chronic low back pain       ED Disposition     ED Disposition Condition Date/Time Comment    Discharge Stable Wed Apr 14, 2021 11:58 AM Yadi Chapin discharge to home/self care  Follow-up Information     Follow up With Specialties Details Why Contact Info Additional Information    [facility] Physical Therapy Call  To establish care  [phone] [address] [facility] Call  To establish care   [address] [id number]  [facility], [address], [place]          Discharge Medication List as of 4/14/2021 12:07 PM      CONTINUE these medications which have NOT CHANGED    Details   alendronate (FOSAMAX) 70 mg tablet Take 70 mg by mouth every 7 days, Historical Med      ascorbic acid (VITAMIN C) 500 mg tablet Take 500 mg by mouth daily, Historical Med      Cholecalciferol (Vitamin D) 50 MCG (2000 UT) tablet ONE DAILY FOR VITAMIN D SUPPLEMENT, Historical Med      famotidine (PEPCID) 20 mg tablet TAKE 1 TABLET(S) TWICE A DAY BY ORAL ROUTE , Historical Med      gabapentin (NEURONTIN) 100 mg capsule Take 100 mg by mouth 3 (three) times a day, Historical Med      lansoprazole (PREVACID) 30 mg capsule Take 30 mg by mouth daily, Historical Med      levothyroxine 25 mcg tablet TAKE 1 TABLET (25 MCG) BY ORAL ROUTE ONCE DAILY AN HOUR BEFORE BREAKFAST-TAKE WITH WATER, Historical Med      Melatonin 5 MG TABS TAKE 1 TABLET(S) EVERY DAY BY ORAL ROUTE AT DINNER FOR 90 DAYS , Historical Med      naproxen (NAPROSYN) 500 mg tablet Take 500 mg by mouth 2 (two) times a day with meals, Historical Med      omeprazole (PriLOSEC) 20 mg delayed release capsule Take 20 mg by mouth daily, Historical Med      traZODone (DESYREL) 50 mg tablet TAKE 1 TABLET (50 MG TOTAL) BY MOUTH DAILY AT BEDTIME, Starting Wed 2/24/2021, Normal           No discharge procedures on file  PDMP Review     None           ED Provider  Attending physically available and evaluated [address]  I managed the patient along with the ED Attending      Electronically Signed by Ludwig Clark 162, DO  04/14/21 0009

## 2021-04-22 ENCOUNTER — TRANSCRIBE ORDERS (OUTPATIENT)
Dept: PHYSICAL THERAPY | Facility: REHABILITATION | Age: 61
End: 2021-04-22

## 2021-04-22 DIAGNOSIS — M54.16 LUMBAR RADICULOPATHY: Primary | ICD-10-CM

## 2021-04-23 ENCOUNTER — TELEPHONE (OUTPATIENT)
Dept: SLEEP CENTER | Facility: CLINIC | Age: 61
End: 2021-04-23

## 2021-04-23 DIAGNOSIS — G47.33 OSA (OBSTRUCTIVE SLEEP APNEA): Primary | ICD-10-CM

## 2021-04-26 DIAGNOSIS — G47.00 INSOMNIA, UNSPECIFIED TYPE: ICD-10-CM

## 2021-04-26 DIAGNOSIS — R06.83 SNORING: ICD-10-CM

## 2021-04-26 RX ORDER — TRAZODONE HYDROCHLORIDE 50 MG/1
50 TABLET ORAL
Qty: 30 TABLET | Refills: 1 | Status: SHIPPED | OUTPATIENT
Start: 2021-04-26

## 2021-04-29 ENCOUNTER — TRANSCRIBE ORDERS (OUTPATIENT)
Dept: RADIOLOGY | Facility: HOSPITAL | Age: 61
End: 2021-04-29

## 2021-04-29 ENCOUNTER — HOSPITAL ENCOUNTER (OUTPATIENT)
Dept: RADIOLOGY | Facility: HOSPITAL | Age: 61
Discharge: HOME | End: 2021-04-29
Payer: MEDICARE

## 2021-04-29 ENCOUNTER — TRANSCRIBE ORDERS (OUTPATIENT)
Dept: ADMINISTRATIVE | Facility: HOSPITAL | Age: 61
End: 2021-04-29

## 2021-04-29 DIAGNOSIS — M54.42 LEFT-SIDED LOW BACK PAIN WITH LEFT-SIDED SCIATICA, UNSPECIFIED CHRONICITY: Primary | ICD-10-CM

## 2021-04-29 DIAGNOSIS — M54.42 LEFT-SIDED LOW BACK PAIN WITH LEFT-SIDED SCIATICA, UNSPECIFIED CHRONICITY: ICD-10-CM

## 2021-04-29 DIAGNOSIS — M54.50 LOW BACK PAIN, UNSPECIFIED BACK PAIN LATERALITY, UNSPECIFIED CHRONICITY, UNSPECIFIED WHETHER SCIATICA PRESENT: Primary | ICD-10-CM

## 2021-04-29 PROCEDURE — 72110 X-RAY EXAM L-2 SPINE 4/>VWS: CPT

## 2021-05-24 ENCOUNTER — OFFICE VISIT (OUTPATIENT)
Dept: SLEEP CENTER | Facility: CLINIC | Age: 61
End: 2021-05-24
Payer: MEDICARE

## 2021-05-24 VITALS
WEIGHT: 181.38 LBS | SYSTOLIC BLOOD PRESSURE: 102 MMHG | HEART RATE: 69 BPM | DIASTOLIC BLOOD PRESSURE: 70 MMHG | HEIGHT: 63 IN | BODY MASS INDEX: 32.14 KG/M2

## 2021-05-24 DIAGNOSIS — G47.33 OSA (OBSTRUCTIVE SLEEP APNEA): Primary | ICD-10-CM

## 2021-05-24 PROCEDURE — 99213 OFFICE O/P EST LOW 20 MIN: CPT | Performed by: PSYCHIATRY & NEUROLOGY

## 2021-05-24 NOTE — PATIENT INSTRUCTIONS
Recommendations:    1) APAP at 4-15cm with FFM  2) Safe driving reviewed  3) Follow-up 12 months  4) Call with any questions or concerns

## 2021-05-24 NOTE — PROGRESS NOTES
Sleep Medicine Follow-Up Note    HPI: 57yo F with MARI presenting for a follow up  Treatment Summary: PSG 2020: apnea/hypopnea index (AHI) of 18 6 events per hour of sleep   The AHI in the supine position was 18  6   The AHI during REM sleep was 53 7  APAP 5-15cm recommended  Currently on 4-15cm  HPI:   Today, patient presents accompanied by her   She stated that she is doing well on the CPAP and is sleeping well  She denied any trouble with pressure or mask  They are having an issue with the original order, as per what the patient's  disclosed what [facility] told him  She needed new headgear as the original one was not a good fit for her  Treatment: APAP  -Pressure: 4-15cm   -Pressure intolerance: no   -Aerophagia: no   -Air Hunger: no  -Interface: FFM  -Fit: good  -Chin strap: no  -HCC: [facility]  -Patient's perceived outcome: feeling good during the day  Not tired  Not sleepy       Compliance Card Data:    - Date Range: 21-21   - Settin-15cm   - Pressure: Median 8 4; 90th%ile = 13cm   - Residual AHI: 3 2   - %  Night in Large Leak: 4 m    - Average usage days used: 7h 41m   - % Days used: 100%   - % Days with Usage > 4 hrs: 100%    Respiratory:  -Ongoing Snoring: no, only when leaking   -Mouth Breathing: no  -Dry Mouth: sometimes  -Nocturnal Gasping: no    Sleep Pattern:  -Position: side and back  -Bedtime: 9-930pm  -Lights Out: same time  -Environment: no Lights/TV  -Latency:  mins  -Awakenings: 0-1  -Wake Time: 7am  -Rise Time: same time  -Patient's estimate of Sleep Time: 8h      Daytime Symptoms:  -Upon Awakening: good  -Daytime fatigue/sleepiness: no  -Naps: no  -Involuntary Dozing: no  -Cognitive Symptoms: no  -Driving: Difficulty with sleepiness and driving:  no   -- Close calls related to sleepiness: no   -- Accidents related to sleepiness: no    ROS:   Genitourinary post menopausal (no peroids)   Cardiology none   Gastrointestinal none   Neurology none   Constitutional claustrophobia and none   Integumentary none   Psychiatry none   Musculoskeletal joint pain, muscle aches, back pain, sciatica and leg cramps   Pulmonary none   ENT none   Endocrine none   Hematological none       --> Denies any significant medical changes since last visit  --> Supplemental Oxygen Use: denies    Questionnaire:  Sitting and reading: Would never doze  Watching TV: Would never doze  Sitting, inactive in a public place (e g  a theatre or a meeting): Would never doze  As a passenger in a car for an hour without a break: Would never doze  Lying down to rest in the afternoon when circumstances permit: Would never doze  Sitting and talking to someone: Would never doze  Sitting quietly after a lunch without alcohol: Slight chance of dozing  In a car, while stopped for a few minutes in traffic: Would never doze  Total score: 1      PE:    /70   Pulse 69   Ht 5' 3" (1 6 m)   Wt 82 3 kg (181 lb 6 oz)   BMI 32 13 kg/m²     General:  In NAD  Pul: Respirations: unlaboured  MS: No atrophy  Neuro: No resting tremor  Gait normal turning & station; unremarkable overall  Psych: Socially appropriate  Pleasant  No overt dysphoria  Assessment: The patient continues to be compliant with her CPAP and her obstructive events are well controlled with a residual AHI 3 2  She continues to derive benefit from using her CPAP as she is less tired than she was in the past and sleeping better  No pressure changes will be made today  asked [facility] rep to look into her issue about the documentation needed for their supplies  No need to take trazodone as she is sleeping well  Recommendations:    1) APAP at 4-15cm with FFM  2) Safe driving reviewed  3) Follow-up 12 months  4) Call with any questions or concerns  All questions answered for the patient and , who indicated understanding and agreed with the plan       Cece Nair MD  Psychiatry/ Sleep medicine

## 2021-05-26 ENCOUNTER — HOSPITAL ENCOUNTER (OUTPATIENT)
Dept: RADIOLOGY | Facility: HOSPITAL | Age: 61
Discharge: HOME | End: 2021-05-26
Payer: MEDICARE

## 2021-05-26 DIAGNOSIS — M54.50 LOW BACK PAIN, UNSPECIFIED BACK PAIN LATERALITY, UNSPECIFIED CHRONICITY, UNSPECIFIED WHETHER SCIATICA PRESENT: ICD-10-CM

## 2021-05-26 PROCEDURE — 72148 MRI LUMBAR SPINE W/O DYE: CPT

## 2021-06-10 ENCOUNTER — TRANSCRIBE ORDERS (OUTPATIENT)
Dept: RADIOLOGY | Facility: HOSPITAL | Age: 61
End: 2021-06-10

## 2021-09-13 ENCOUNTER — TELEPHONE (OUTPATIENT)
Dept: SLEEP CENTER | Facility: CLINIC | Age: 61
End: 2021-09-13

## 2021-09-13 DIAGNOSIS — G47.33 OSA (OBSTRUCTIVE SLEEP APNEA): Primary | ICD-10-CM

## 2021-11-09 ENCOUNTER — TELEPHONE (OUTPATIENT)
Dept: SLEEP CENTER | Facility: CLINIC | Age: 61
End: 2021-11-09
